# Patient Record
Sex: FEMALE | Race: ASIAN | NOT HISPANIC OR LATINO | Employment: PART TIME | URBAN - METROPOLITAN AREA
[De-identification: names, ages, dates, MRNs, and addresses within clinical notes are randomized per-mention and may not be internally consistent; named-entity substitution may affect disease eponyms.]

---

## 2023-03-20 ENCOUNTER — CLINICAL SUPPORT (OUTPATIENT)
Dept: PRIMARY CARE | Facility: CLINIC | Age: 68
End: 2023-03-20
Payer: MEDICARE

## 2023-03-20 ENCOUNTER — TELEPHONE (OUTPATIENT)
Dept: PRIMARY CARE | Facility: CLINIC | Age: 68
End: 2023-03-20

## 2023-03-20 DIAGNOSIS — R30.0 DYSURIA: Primary | ICD-10-CM

## 2023-03-20 LAB
POC APPEARANCE, URINE: ABNORMAL
POC BILIRUBIN, URINE: NEGATIVE
POC BLOOD, URINE: ABNORMAL
POC COLOR, URINE: ABNORMAL
POC GLUCOSE, URINE: NEGATIVE MG/DL
POC KETONES, URINE: NEGATIVE MG/DL
POC LEUKOCYTES, URINE: ABNORMAL
POC NITRITE,URINE: NEGATIVE
POC PH, URINE: 7 PH
POC PROTEIN, URINE: NEGATIVE MG/DL
POC SPECIFIC GRAVITY, URINE: 1.01
POC UROBILINOGEN, URINE: 0.2 EU/DL

## 2023-03-20 PROCEDURE — 81003 URINALYSIS AUTO W/O SCOPE: CPT | Performed by: NURSE PRACTITIONER

## 2023-03-20 PROCEDURE — 87086 URINE CULTURE/COLONY COUNT: CPT

## 2023-03-20 PROCEDURE — 87077 CULTURE AEROBIC IDENTIFY: CPT

## 2023-03-20 PROCEDURE — 87186 SC STD MICRODIL/AGAR DIL: CPT

## 2023-03-20 RX ORDER — CIPROFLOXACIN 500 MG/1
500 TABLET ORAL 2 TIMES DAILY
Qty: 10 TABLET | Refills: 0 | Status: SHIPPED | OUTPATIENT
Start: 2023-03-20 | End: 2023-03-25

## 2023-03-20 NOTE — TELEPHONE ENCOUNTER
----- Message from JESS Westbrook-CNP sent at 3/20/2023 12:40 PM EDT -----  Tell her Urine is positive - I will send antibiotic- please send out for sensitivity

## 2023-03-22 LAB — URINE CULTURE: ABNORMAL

## 2023-03-23 ENCOUNTER — TELEPHONE (OUTPATIENT)
Dept: PRIMARY CARE | Facility: CLINIC | Age: 68
End: 2023-03-23
Payer: MEDICARE

## 2023-03-23 NOTE — TELEPHONE ENCOUNTER
----- Message from JESS Westbrook-CNP sent at 3/23/2023  7:21 AM EDT -----  PNP that the antibiotic should help her- let me know if not

## 2023-03-26 PROBLEM — M25.561 ARTHRALGIA OF BOTH KNEES: Status: ACTIVE | Noted: 2023-03-26

## 2023-03-26 PROBLEM — G47.00 INSOMNIA: Status: ACTIVE | Noted: 2023-03-26

## 2023-03-26 PROBLEM — M51.369 DEGENERATIVE DISC DISEASE, LUMBAR: Status: ACTIVE | Noted: 2023-03-26

## 2023-03-26 PROBLEM — N95.2 ATROPHY OF VAGINA: Status: ACTIVE | Noted: 2023-03-26

## 2023-03-26 PROBLEM — M54.16 LUMBAR RADICULAR PAIN: Status: ACTIVE | Noted: 2023-03-26

## 2023-03-26 PROBLEM — M25.542 JOINT PAIN IN BOTH HANDS: Status: ACTIVE | Noted: 2023-03-26

## 2023-03-26 PROBLEM — M25.552 LEFT HIP PAIN: Status: ACTIVE | Noted: 2023-03-26

## 2023-03-26 PROBLEM — M25.541 JOINT PAIN IN BOTH HANDS: Status: ACTIVE | Noted: 2023-03-26

## 2023-03-26 PROBLEM — M51.36 DEGENERATIVE DISC DISEASE, LUMBAR: Status: ACTIVE | Noted: 2023-03-26

## 2023-03-26 PROBLEM — F41.9 MILD ANXIETY: Status: ACTIVE | Noted: 2023-03-26

## 2023-03-26 PROBLEM — M25.562 ARTHRALGIA OF BOTH KNEES: Status: ACTIVE | Noted: 2023-03-26

## 2023-03-26 PROBLEM — B07.0 PLANTAR WARTS: Status: ACTIVE | Noted: 2023-03-26

## 2023-03-26 RX ORDER — CLONAZEPAM 1 MG/1
1 TABLET ORAL NIGHTLY
COMMUNITY
Start: 2014-08-08 | End: 2023-03-30 | Stop reason: SDUPTHER

## 2023-03-26 RX ORDER — LISINOPRIL 10 MG/1
1 TABLET ORAL DAILY
COMMUNITY
End: 2023-11-29 | Stop reason: SDUPTHER

## 2023-03-26 RX ORDER — ROPINIROLE 1 MG/1
1 TABLET, FILM COATED ORAL 3 TIMES DAILY
COMMUNITY
Start: 2014-08-08 | End: 2023-11-29 | Stop reason: SDUPTHER

## 2023-03-30 ENCOUNTER — OFFICE VISIT (OUTPATIENT)
Dept: PRIMARY CARE | Facility: CLINIC | Age: 68
End: 2023-03-30
Payer: MEDICARE

## 2023-03-30 VITALS
SYSTOLIC BLOOD PRESSURE: 110 MMHG | HEIGHT: 59 IN | HEART RATE: 78 BPM | DIASTOLIC BLOOD PRESSURE: 68 MMHG | TEMPERATURE: 98.1 F | RESPIRATION RATE: 16 BRPM | WEIGHT: 107 LBS | OXYGEN SATURATION: 96 % | BODY MASS INDEX: 21.57 KG/M2

## 2023-03-30 DIAGNOSIS — F41.9 MILD ANXIETY: Primary | ICD-10-CM

## 2023-03-30 DIAGNOSIS — R30.0 DYSURIA: ICD-10-CM

## 2023-03-30 PROCEDURE — 1036F TOBACCO NON-USER: CPT | Performed by: NURSE PRACTITIONER

## 2023-03-30 PROCEDURE — 99213 OFFICE O/P EST LOW 20 MIN: CPT | Performed by: NURSE PRACTITIONER

## 2023-03-30 PROCEDURE — 87086 URINE CULTURE/COLONY COUNT: CPT

## 2023-03-30 RX ORDER — CLONAZEPAM 1 MG/1
1 TABLET ORAL 2 TIMES DAILY
Qty: 180 TABLET | Refills: 0 | Status: SHIPPED | OUTPATIENT
Start: 2023-03-30 | End: 2023-05-30 | Stop reason: SDUPTHER

## 2023-03-30 ASSESSMENT — PATIENT HEALTH QUESTIONNAIRE - PHQ9
2. FEELING DOWN, DEPRESSED OR HOPELESS: NOT AT ALL
1. LITTLE INTEREST OR PLEASURE IN DOING THINGS: NOT AT ALL
SUM OF ALL RESPONSES TO PHQ9 QUESTIONS 1 AND 2: 0

## 2023-03-30 ASSESSMENT — ENCOUNTER SYMPTOMS
DEPRESSION: 0
OCCASIONAL FEELINGS OF UNSTEADINESS: 0
LOSS OF SENSATION IN FEET: 0

## 2023-03-30 NOTE — PROGRESS NOTES
Subjective   Patient ID: Elin Randhawa is a 67 y.o. female who presents for Follow-up (Patient present today for a follow up and medication refills. ).  HPI   She is still feeling a little difference in her urination and would like a recheck     She is having a harder time sleeping but has had a lot of difficult issues arise and she feels that she is having more issues with her anxiety  Discussed therapist and she will think about it    Review of Systems  Constitutional: no chills, no fever and no night sweats.   Eyes: no blurred vision and no eyesight problems.   Cardiovascular: no chest pain, no intermittent leg claudication, no lower extremity edema, no palpitations and no syncope.   Respiratory: no cough, no shortness of breath during exertion, no shortness of breath at rest and no wheezing.   Gastrointestinal: no abdominal pain, no blood in stools, chronic constipation, no diarrhea, no melena, no nausea, no rectal pain and no vomiting.  Musculoskeletal: no arthralgias, no back pain and no myalgias.   Integumentary: no new skin lesions and no rashes.   Neurological: no difficulty walking, no headache, no limb weakness, No numbness and tno ingling.   Psychiatric: Worsening anxiety, no depression, no anhedonia and no substance use disorders.      Objective   Physical Exam  Physical Exam  Constitutional: Alert and in no acute distress. Well developed, well nourished.   Head and face: Normal.   Eyes: Normal external exam.  Cardiovascular: Heart rate and rhythm were normal, normal S1 and S2, no gallops, no murmurs and no pericardial rub. Carotid pulses: Normal with no bruits. No peripheral edema.   Pulmonary: No respiratory distress. Clear bilateral breath sounds.  palpated. Normal bowel sounds.   Muscular: Normal gait and station   Neurologic: Cortical function: Normal. Coordination: Normal.   Psychiatric: Judgment and insight: Intact. Alert and oriented x 3. Mood and affect: slightly tearful        Assessment/Plan   1. Mild anxiety  OAARS report was done, reviewed and no red flags noted. UDS and CSA are up to date.    - clonazePAM (KlonoPIN) 1 mg tablet; Take 1 tablet (1 mg) by mouth in the morning and 1 tablet (1 mg) before bedtime.  Dispense: 180 tablet; Refill: 0

## 2023-04-01 LAB — URINE CULTURE: NORMAL

## 2023-04-05 ENCOUNTER — TELEPHONE (OUTPATIENT)
Dept: PRIMARY CARE | Facility: CLINIC | Age: 68
End: 2023-04-05
Payer: MEDICARE

## 2023-04-05 NOTE — TELEPHONE ENCOUNTER
----- Message from JESS Westbrook-CNP sent at 4/3/2023  7:19 AM EDT -----  Please let Angela know that there is no significant growth - UTI should be cleared

## 2023-05-01 ENCOUNTER — PATIENT MESSAGE (OUTPATIENT)
Dept: PRIMARY CARE | Facility: CLINIC | Age: 68
End: 2023-05-01
Payer: MEDICARE

## 2023-05-01 DIAGNOSIS — M25.552 LEFT HIP PAIN: ICD-10-CM

## 2023-05-01 DIAGNOSIS — M54.16 LUMBAR RADICULAR PAIN: Primary | ICD-10-CM

## 2023-05-01 DIAGNOSIS — M51.36 DEGENERATIVE DISC DISEASE, LUMBAR: ICD-10-CM

## 2023-05-26 ENCOUNTER — PATIENT MESSAGE (OUTPATIENT)
Dept: PRIMARY CARE | Facility: CLINIC | Age: 68
End: 2023-05-26
Payer: MEDICARE

## 2023-05-26 DIAGNOSIS — F41.9 MILD ANXIETY: ICD-10-CM

## 2023-05-26 NOTE — TELEPHONE ENCOUNTER
"From: Elin Randhawa  To: Kellen Dow, APRN-CNP  Sent: 5/26/2023 8:11 AM EDT  Subject: Prescription vacation supply    Good morning, aCtrina. I have an appointment on 6/12, however, I'm concerned that my prescription refill for Clonazepam might not get here on time before 6/21 when I leave for my vacation. I don't know how long it takes them to process and ship it to me. I was told by [facility] that it can be filled earlier by requesting for a \"vacation supply override.\" Would you be able to send the prescription before I see you on 6/12?    Thank you and I will see you on 6/12.    Elin  "

## 2023-05-30 RX ORDER — CLONAZEPAM 1 MG/1
1 TABLET ORAL 2 TIMES DAILY
Qty: 180 TABLET | Refills: 0 | Status: SHIPPED | OUTPATIENT
Start: 2023-05-30 | End: 2023-05-30 | Stop reason: SDUPTHER

## 2023-05-30 RX ORDER — CLONAZEPAM 1 MG/1
1 TABLET ORAL 2 TIMES DAILY
Qty: 180 TABLET | Refills: 0 | Status: SHIPPED | OUTPATIENT
Start: 2023-05-30 | End: 2023-09-26 | Stop reason: SDUPTHER

## 2023-06-12 ENCOUNTER — OFFICE VISIT (OUTPATIENT)
Dept: PRIMARY CARE | Facility: CLINIC | Age: 68
End: 2023-06-12
Payer: MEDICARE

## 2023-06-12 VITALS
WEIGHT: 104.8 LBS | TEMPERATURE: 97.9 F | SYSTOLIC BLOOD PRESSURE: 100 MMHG | DIASTOLIC BLOOD PRESSURE: 78 MMHG | OXYGEN SATURATION: 97 % | HEART RATE: 77 BPM | BODY MASS INDEX: 21.53 KG/M2

## 2023-06-12 DIAGNOSIS — G47.00 INSOMNIA, UNSPECIFIED TYPE: Primary | ICD-10-CM

## 2023-06-12 DIAGNOSIS — F41.9 MILD ANXIETY: ICD-10-CM

## 2023-06-12 DIAGNOSIS — G89.29 CHRONIC PAIN OF BOTH KNEES: ICD-10-CM

## 2023-06-12 DIAGNOSIS — G25.81 RESTLESS LEGS SYNDROME: ICD-10-CM

## 2023-06-12 DIAGNOSIS — M25.552 LEFT HIP PAIN: ICD-10-CM

## 2023-06-12 DIAGNOSIS — M25.561 CHRONIC PAIN OF BOTH KNEES: ICD-10-CM

## 2023-06-12 DIAGNOSIS — M25.562 CHRONIC PAIN OF BOTH KNEES: ICD-10-CM

## 2023-06-12 PROCEDURE — 1159F MED LIST DOCD IN RCRD: CPT | Performed by: NURSE PRACTITIONER

## 2023-06-12 PROCEDURE — 99213 OFFICE O/P EST LOW 20 MIN: CPT | Performed by: NURSE PRACTITIONER

## 2023-06-12 PROCEDURE — 1036F TOBACCO NON-USER: CPT | Performed by: NURSE PRACTITIONER

## 2023-06-12 ASSESSMENT — ENCOUNTER SYMPTOMS
NERVOUS/ANXIOUS: 1
ENDOCRINE NEGATIVE: 1
GASTROINTESTINAL NEGATIVE: 1
RESPIRATORY NEGATIVE: 1
SLEEP DISTURBANCE: 1
HEMATOLOGIC/LYMPHATIC NEGATIVE: 1
NEUROLOGICAL NEGATIVE: 1
CARDIOVASCULAR NEGATIVE: 1
EYES NEGATIVE: 1
MUSCULOSKELETAL NEGATIVE: 1
CONSTITUTIONAL NEGATIVE: 1

## 2023-06-12 NOTE — PATIENT INSTRUCTIONS
I have sent over the Clonazepam - earlier- Your last conversation with pharmacy said all was that everything is all set and they are sending 60 days   If she does not get the medication on Thursday I will print an RX that she can  at local pharmacy

## 2023-06-12 NOTE — PROGRESS NOTES
Subjective   Patient ID: Elin Randhawa is a 67 y.o. female who presents for Follow-up (3 Months.) and Medication Question (Anxiety meds for plane ).    HPI   Patient here for 3 month follow up. No concerns, Is leaving the country for vacation and need medication refill.   She has been having issues with getting her clonazepam refilled because she needs to get this refilled   She is still having issues with her back pain hip pain and bilateral knee pain   She would like to get Xray orders and get them done   In the [facility] as it will be cheaper - discussed that she will need the radiology report or she will pay to have them read here   Review of Systems   Constitutional: Negative.    HENT: Negative.     Eyes: Negative.    Respiratory: Negative.     Cardiovascular: Negative.    Gastrointestinal: Negative.    Endocrine: Negative.    Genitourinary: Negative.    Musculoskeletal: Negative.    Skin: Negative.    Allergic/Immunologic: Positive for environmental allergies.   Neurological: Negative.    Hematological: Negative.    Psychiatric/Behavioral:  Positive for sleep disturbance. The patient is nervous/anxious.        Objective   /78   Pulse 77   Temp 36.6 °C (97.9 °F) (Temporal)   Wt 47.5 kg (104 lb 12.8 oz)   SpO2 97%   BMI 21.53 kg/m²     Physical Exam  Constitutional:       Appearance: Normal appearance.   Cardiovascular:      Rate and Rhythm: Normal rate and regular rhythm.      Pulses: Normal pulses.      Heart sounds: Normal heart sounds.   Pulmonary:      Effort: Pulmonary effort is normal.      Breath sounds: Normal breath sounds.   Abdominal:      General: Bowel sounds are normal.      Palpations: Abdomen is soft.   Skin:     General: Skin is warm.   Neurological:      General: No focal deficit present.      Mental Status: She is alert and oriented to person, place, and time. Mental status is at baseline.   Psychiatric:         Mood and Affect: Mood normal.         Behavior: Behavior normal.          Thought Content: Thought content normal.         Judgment: Judgment normal.         Assessment/Plan   Problem List Items Addressed This Visit          Nervous    Insomnia - Primary    Restless legs syndrome       Other    Mild anxiety     I have sent over the Clonazepam - earlier- Your last conversation with pharmacy said all was that everything is all set and they are sending 60 days   If she does not get the medication on Thursday I will print an RX that she can  at local pharmacy

## 2023-06-13 ENCOUNTER — TELEPHONE (OUTPATIENT)
Dept: PRIMARY CARE | Facility: CLINIC | Age: 68
End: 2023-06-13
Payer: MEDICARE

## 2023-06-13 NOTE — TELEPHONE ENCOUNTER
Pt calling back- she does not need you to send to [facility] [place]. Pt is paying for expedited shipping.

## 2023-06-13 NOTE — TELEPHONE ENCOUNTER
[place] from [facility] requesting clonazepam be sent to [facility]. Pt is going out of the country and mail order will not arrive in time.

## 2023-09-21 ENCOUNTER — TELEPHONE (OUTPATIENT)
Dept: PRIMARY CARE | Facility: CLINIC | Age: 68
End: 2023-09-21
Payer: MEDICARE

## 2023-09-21 DIAGNOSIS — F41.9 MILD ANXIETY: ICD-10-CM

## 2023-09-26 RX ORDER — CLONAZEPAM 1 MG/1
1 TABLET ORAL 2 TIMES DAILY
Qty: 180 TABLET | Refills: 0 | Status: SHIPPED | OUTPATIENT
Start: 2023-09-26 | End: 2024-01-11 | Stop reason: SDUPTHER

## 2023-10-10 ENCOUNTER — OFFICE VISIT (OUTPATIENT)
Dept: PRIMARY CARE | Facility: CLINIC | Age: 68
End: 2023-10-10
Payer: MEDICARE

## 2023-10-10 VITALS
WEIGHT: 106.2 LBS | BODY MASS INDEX: 21.41 KG/M2 | HEART RATE: 70 BPM | DIASTOLIC BLOOD PRESSURE: 68 MMHG | TEMPERATURE: 98.2 F | SYSTOLIC BLOOD PRESSURE: 128 MMHG | HEIGHT: 59 IN | OXYGEN SATURATION: 97 % | RESPIRATION RATE: 18 BRPM

## 2023-10-10 DIAGNOSIS — Z79.899 HIGH RISK MEDICATION USE: ICD-10-CM

## 2023-10-10 DIAGNOSIS — G47.00 INSOMNIA, UNSPECIFIED TYPE: ICD-10-CM

## 2023-10-10 DIAGNOSIS — Z00.00 HEALTHCARE MAINTENANCE: ICD-10-CM

## 2023-10-10 DIAGNOSIS — Z12.31 BREAST CANCER SCREENING BY MAMMOGRAM: ICD-10-CM

## 2023-10-10 DIAGNOSIS — R25.2 MUSCLE CRAMPING: ICD-10-CM

## 2023-10-10 DIAGNOSIS — Z13.220 LIPID SCREENING: Primary | ICD-10-CM

## 2023-10-10 DIAGNOSIS — M51.36 DEGENERATIVE DISC DISEASE, LUMBAR: ICD-10-CM

## 2023-10-10 PROCEDURE — 99214 OFFICE O/P EST MOD 30 MIN: CPT | Performed by: FAMILY MEDICINE

## 2023-10-10 PROCEDURE — 90686 IIV4 VACC NO PRSV 0.5 ML IM: CPT | Performed by: FAMILY MEDICINE

## 2023-10-10 PROCEDURE — 1036F TOBACCO NON-USER: CPT | Performed by: FAMILY MEDICINE

## 2023-10-10 PROCEDURE — 1125F AMNT PAIN NOTED PAIN PRSNT: CPT | Performed by: FAMILY MEDICINE

## 2023-10-10 PROCEDURE — G0008 ADMIN INFLUENZA VIRUS VAC: HCPCS | Performed by: FAMILY MEDICINE

## 2023-10-10 PROCEDURE — 1170F FXNL STATUS ASSESSED: CPT | Performed by: FAMILY MEDICINE

## 2023-10-10 PROCEDURE — 1159F MED LIST DOCD IN RCRD: CPT | Performed by: FAMILY MEDICINE

## 2023-10-10 PROCEDURE — 1160F RVW MEDS BY RX/DR IN RCRD: CPT | Performed by: FAMILY MEDICINE

## 2023-10-10 PROCEDURE — G0439 PPPS, SUBSEQ VISIT: HCPCS | Performed by: FAMILY MEDICINE

## 2023-10-10 RX ORDER — MULTIVITAMIN
1 TABLET ORAL DAILY
COMMUNITY

## 2023-10-10 RX ORDER — MELOXICAM 7.5 MG/1
7.5 TABLET ORAL DAILY
Qty: 30 TABLET | Refills: 0 | Status: SHIPPED | OUTPATIENT
Start: 2023-10-10 | End: 2023-11-09

## 2023-10-10 ASSESSMENT — ENCOUNTER SYMPTOMS
ARTHRALGIAS: 1
OCCASIONAL FEELINGS OF UNSTEADINESS: 0
VOMITING: 0
DYSPHORIC MOOD: 0
MYALGIAS: 0
CHILLS: 0
FEVER: 0
DIARRHEA: 0
CONSTIPATION: 0
ABDOMINAL PAIN: 0
SHORTNESS OF BREATH: 0
HEADACHES: 0
NAUSEA: 0
BACK PAIN: 1
DEPRESSION: 0
PALPITATIONS: 0
LOSS OF SENSATION IN FEET: 0
DIZZINESS: 0
WEAKNESS: 0
NERVOUS/ANXIOUS: 0
COUGH: 0
FATIGUE: 0

## 2023-10-10 ASSESSMENT — ACTIVITIES OF DAILY LIVING (ADL)
DOING_HOUSEWORK: INDEPENDENT
TAKING_MEDICATION: INDEPENDENT
MANAGING_FINANCES: INDEPENDENT
DRESSING: INDEPENDENT
BATHING: INDEPENDENT
GROCERY_SHOPPING: INDEPENDENT

## 2023-10-10 ASSESSMENT — COLUMBIA-SUICIDE SEVERITY RATING SCALE - C-SSRS
6. HAVE YOU EVER DONE ANYTHING, STARTED TO DO ANYTHING, OR PREPARED TO DO ANYTHING TO END YOUR LIFE?: NO
2. HAVE YOU ACTUALLY HAD ANY THOUGHTS OF KILLING YOURSELF?: NO
1. IN THE PAST MONTH, HAVE YOU WISHED YOU WERE DEAD OR WISHED YOU COULD GO TO SLEEP AND NOT WAKE UP?: NO

## 2023-10-10 ASSESSMENT — ANXIETY QUESTIONNAIRES
7. FEELING AFRAID AS IF SOMETHING AWFUL MIGHT HAPPEN: NOT AT ALL
4. TROUBLE RELAXING: NOT AT ALL
3. WORRYING TOO MUCH ABOUT DIFFERENT THINGS: NOT AT ALL
GAD7 TOTAL SCORE: 0
5. BEING SO RESTLESS THAT IT IS HARD TO SIT STILL: NOT AT ALL
2. NOT BEING ABLE TO STOP OR CONTROL WORRYING: NOT AT ALL
6. BECOMING EASILY ANNOYED OR IRRITABLE: NOT AT ALL
1. FEELING NERVOUS, ANXIOUS, OR ON EDGE: NOT AT ALL

## 2023-10-10 ASSESSMENT — PAIN SCALES - GENERAL: PAINLEVEL: 6

## 2023-10-10 ASSESSMENT — PATIENT HEALTH QUESTIONNAIRE - PHQ9
2. FEELING DOWN, DEPRESSED OR HOPELESS: NOT AT ALL
SUM OF ALL RESPONSES TO PHQ9 QUESTIONS 1 AND 2: 0
1. LITTLE INTEREST OR PLEASURE IN DOING THINGS: NOT AT ALL

## 2023-10-10 NOTE — PROGRESS NOTES
Subjective   Reason for Visit: Elin Randhawa is an 67 y.o. female here for a Medicare Wellness visit.     Past Medical, Surgical, and Family History reviewed and updated in chart.    Reviewed all medications by prescribing practitioner or clinical pharmacist (such as prescriptions, OTCs, herbal therapies and supplements) and documented in the medical record.    Arthralgias- xrays of knees and lumbar spine recently completed and were reviewed today, results showed degenerative changes in each film. Is very active-low impact activities and takes OTC NSAIDS as needed, has cut back on her activity due to low back pain. Has tried massage that helps. Has tried acupuncture, yoga, stretching as well. Denies saddle anesthesia, loss of bladder function    Anxiety-takes clonazepam daily for sleep and anxiety and is working well for her. CSA and UDS updated today.        OARRS:  JESS Lanier-CNP on 10/10/2023 12:01 PM  I have personally reviewed the OARRS report for Elin Randhawa. I have considered the risks of abuse, dependence, addiction and diversion and I believe that it is clinically appropriate for Elin Randhawa to be prescribed this medication    Is the patient prescribed a combination of a benzodiazepine and opioid?  No    Last Urine Drug Screen / ordered today: Yes  No results found for this or any previous visit (from the past 8760 hour(s)).  N/A        Controlled Substance Agreement:  Date of the Last Agreement: 10/10/23  Reviewed Controlled Substance Agreement including but not limited to the benefits, risks, and alternatives to treatment with a Controlled Substance medication(s).    Benzodiazepines:  What is the patient's goal of therapy? To improve sleep related to anxiety  Is this being achieved with current treatment? yes    KARINA-7:  Over the last 2 weeks, how often have you been bothered by any of the following problems?  Feeling nervous, anxious, or on edge: 0  Not being able to stop or  "control worryin  Worrying too much about different things: 0  Trouble relaxin  Being so restless that it is hard to sit still: 0  Becoming easily annoyed or irritable: 0  Feeling afraid as if something awful might happen: 0  KARINA-7 Total Score: 0        Activities of Daily Living:   Is your overall impression that this patient is benefiting (symptom reduction outweighs side effects) from benzodiazepine therapy? Yes     1. Physical Functioning: Better  2. Family Relationship: Better  3. Social Relationship: Better  4. Mood: Better  5. Sleep Patterns: Better  6. Overall Function: Better    Patient Care Team:  KIRILL Lanier as PCP - General (Family Medicine)  KIRILL Westbrook as PCP - [employer] PCP     Review of Systems   Constitutional:  Negative for chills, fatigue and fever.   Eyes:  Negative for visual disturbance.   Respiratory:  Negative for cough and shortness of breath.    Cardiovascular:  Negative for chest pain and palpitations.   Gastrointestinal:  Negative for abdominal pain, constipation, diarrhea, nausea and vomiting.   Musculoskeletal:  Positive for arthralgias and back pain. Negative for myalgias.   Skin:  Negative for rash.   Neurological:  Negative for dizziness, syncope, weakness and headaches.   Psychiatric/Behavioral:  Negative for dysphoric mood. The patient is not nervous/anxious.        Objective   Vitals:  /68 (BP Location: Right arm, Patient Position: Sitting, BP Cuff Size: Adult)   Pulse 70   Temp 36.8 °C (98.2 °F)   Resp 18   Ht 1.499 m (4' 11\")   Wt 48.2 kg (106 lb 3.2 oz)   SpO2 97%   BMI 21.45 kg/m²       Physical Exam  Constitutional:       Appearance: Normal appearance.   HENT:      Head: Normocephalic and atraumatic.   Cardiovascular:      Rate and Rhythm: Normal rate and regular rhythm.      Heart sounds: No murmur heard.     No gallop.   Pulmonary:      Effort: Pulmonary effort is normal. No respiratory distress.      " Breath sounds: Normal breath sounds.   Abdominal:      General: Bowel sounds are normal. There is no distension.      Tenderness: There is no abdominal tenderness.   Musculoskeletal:         General: Normal range of motion.   Skin:     General: Skin is warm and dry.      Findings: No lesion or rash.   Neurological:      General: No focal deficit present.      Mental Status: She is alert and oriented to person, place, and time. Mental status is at baseline.   Psychiatric:         Mood and Affect: Mood normal.         Behavior: Behavior normal.         Assessment/Plan   Problem List Items Addressed This Visit       Degenerative disc disease, lumbar    Relevant Medications    meloxicam (Mobic) 7.5 mg tablet    Insomnia    Relevant Orders    CBC and Auto Differential     Other Visit Diagnoses       Lipid screening    -  Primary    Relevant Orders    Lipid Panel    Healthcare maintenance        Relevant Orders    TSH with reflex to Free T4 if abnormal    Comprehensive Metabolic Panel    CBC and Auto Differential    High risk medication use        Relevant Orders    Drug Screen, Urine With Reflex to Confirmation    Breast cancer screening by mammogram        Relevant Orders    BI mammo bilateral screening tomosynthesis    Muscle cramping        Relevant Orders    Magnesium

## 2023-10-25 ENCOUNTER — APPOINTMENT (OUTPATIENT)
Dept: RADIOLOGY | Facility: HOSPITAL | Age: 68
End: 2023-10-25
Payer: MEDICARE

## 2023-10-25 ENCOUNTER — HOSPITAL ENCOUNTER (EMERGENCY)
Facility: HOSPITAL | Age: 68
Discharge: HOME | End: 2023-10-25
Attending: STUDENT IN AN ORGANIZED HEALTH CARE EDUCATION/TRAINING PROGRAM
Payer: MEDICARE

## 2023-10-25 VITALS
BODY MASS INDEX: 22.04 KG/M2 | HEART RATE: 74 BPM | SYSTOLIC BLOOD PRESSURE: 122 MMHG | HEIGHT: 59 IN | WEIGHT: 109.35 LBS | DIASTOLIC BLOOD PRESSURE: 60 MMHG | OXYGEN SATURATION: 98 % | RESPIRATION RATE: 20 BRPM | TEMPERATURE: 98.6 F

## 2023-10-25 DIAGNOSIS — M79.18 MUSCULOSKELETAL PAIN: ICD-10-CM

## 2023-10-25 DIAGNOSIS — Z04.1 EXAM FOLLOWING MVC (MOTOR VEHICLE COLLISION), NO APPARENT INJURY: Primary | ICD-10-CM

## 2023-10-25 LAB
ABO GROUP (TYPE) IN BLOOD: NORMAL
ALBUMIN SERPL BCP-MCNC: 4.2 G/DL (ref 3.4–5)
ALP SERPL-CCNC: 72 U/L (ref 33–136)
ALT SERPL W P-5'-P-CCNC: 40 U/L (ref 7–45)
ANION GAP SERPL CALC-SCNC: 13 MMOL/L (ref 10–20)
ANTIBODY SCREEN: NORMAL
AST SERPL W P-5'-P-CCNC: 36 U/L (ref 9–39)
BASOPHILS # BLD AUTO: 0.06 X10*3/UL (ref 0–0.1)
BASOPHILS NFR BLD AUTO: 0.9 %
BILIRUB SERPL-MCNC: 0.5 MG/DL (ref 0–1.2)
BUN SERPL-MCNC: 24 MG/DL (ref 6–23)
CALCIUM SERPL-MCNC: 9.6 MG/DL (ref 8.6–10.3)
CARDIAC TROPONIN I PNL SERPL HS: 3 NG/L (ref 0–13)
CHLORIDE SERPL-SCNC: 103 MMOL/L (ref 98–107)
CO2 SERPL-SCNC: 24 MMOL/L (ref 21–32)
CREAT SERPL-MCNC: 0.82 MG/DL (ref 0.5–1.05)
EOSINOPHIL # BLD AUTO: 0.46 X10*3/UL (ref 0–0.7)
EOSINOPHIL NFR BLD AUTO: 7 %
ERYTHROCYTE [DISTWIDTH] IN BLOOD BY AUTOMATED COUNT: 13.2 % (ref 11.5–14.5)
ETHANOL SERPL-MCNC: <10 MG/DL
GFR SERPL CREATININE-BSD FRML MDRD: 79 ML/MIN/1.73M*2
GLUCOSE SERPL-MCNC: 99 MG/DL (ref 74–99)
HCT VFR BLD AUTO: 41.3 % (ref 36–46)
HGB BLD-MCNC: 13.2 G/DL (ref 12–16)
IMM GRANULOCYTES # BLD AUTO: 0.09 X10*3/UL (ref 0–0.7)
IMM GRANULOCYTES NFR BLD AUTO: 1.4 % (ref 0–0.9)
INR PPP: 1 (ref 0.9–1.1)
LACTATE SERPL-SCNC: 0.6 MMOL/L (ref 0.4–2)
LYMPHOCYTES # BLD AUTO: 2.09 X10*3/UL (ref 1.2–4.8)
LYMPHOCYTES NFR BLD AUTO: 31.7 %
MCH RBC QN AUTO: 28.8 PG (ref 26–34)
MCHC RBC AUTO-ENTMCNC: 32 G/DL (ref 32–36)
MCV RBC AUTO: 90 FL (ref 80–100)
MONOCYTES # BLD AUTO: 0.47 X10*3/UL (ref 0.1–1)
MONOCYTES NFR BLD AUTO: 7.1 %
NEUTROPHILS # BLD AUTO: 3.43 X10*3/UL (ref 1.2–7.7)
NEUTROPHILS NFR BLD AUTO: 51.9 %
NRBC BLD-RTO: 0 /100 WBCS (ref 0–0)
PLATELET # BLD AUTO: 328 X10*3/UL (ref 150–450)
PMV BLD AUTO: 8.9 FL (ref 7.5–11.5)
POTASSIUM SERPL-SCNC: 3.8 MMOL/L (ref 3.5–5.3)
PROT SERPL-MCNC: 7.2 G/DL (ref 6.4–8.2)
PROTHROMBIN TIME: 11.6 SECONDS (ref 9.8–12.8)
RBC # BLD AUTO: 4.59 X10*6/UL (ref 4–5.2)
RH FACTOR (ANTIGEN D): NORMAL
SODIUM SERPL-SCNC: 136 MMOL/L (ref 136–145)
WBC # BLD AUTO: 6.6 X10*3/UL (ref 4.4–11.3)

## 2023-10-25 PROCEDURE — 2500000004 HC RX 250 GENERAL PHARMACY W/ HCPCS (ALT 636 FOR OP/ED): Performed by: STUDENT IN AN ORGANIZED HEALTH CARE EDUCATION/TRAINING PROGRAM

## 2023-10-25 PROCEDURE — 70450 CT HEAD/BRAIN W/O DYE: CPT | Performed by: RADIOLOGY

## 2023-10-25 PROCEDURE — 70450 CT HEAD/BRAIN W/O DYE: CPT

## 2023-10-25 PROCEDURE — 72125 CT NECK SPINE W/O DYE: CPT | Performed by: RADIOLOGY

## 2023-10-25 PROCEDURE — 80053 COMPREHEN METABOLIC PANEL: CPT | Performed by: STUDENT IN AN ORGANIZED HEALTH CARE EDUCATION/TRAINING PROGRAM

## 2023-10-25 PROCEDURE — 72125 CT NECK SPINE W/O DYE: CPT | Mod: MG

## 2023-10-25 PROCEDURE — 71045 X-RAY EXAM CHEST 1 VIEW: CPT | Mod: FY

## 2023-10-25 PROCEDURE — 72170 X-RAY EXAM OF PELVIS: CPT | Mod: FY

## 2023-10-25 PROCEDURE — 99285 EMERGENCY DEPT VISIT HI MDM: CPT | Mod: 25

## 2023-10-25 PROCEDURE — 71260 CT THORAX DX C+: CPT | Mod: RSC | Performed by: RADIOLOGY

## 2023-10-25 PROCEDURE — 72128 CT CHEST SPINE W/O DYE: CPT | Mod: RSC,MG

## 2023-10-25 PROCEDURE — 86901 BLOOD TYPING SEROLOGIC RH(D): CPT | Performed by: STUDENT IN AN ORGANIZED HEALTH CARE EDUCATION/TRAINING PROGRAM

## 2023-10-25 PROCEDURE — 96374 THER/PROPH/DIAG INJ IV PUSH: CPT | Mod: 59

## 2023-10-25 PROCEDURE — 99291 CRITICAL CARE FIRST HOUR: CPT | Performed by: STUDENT IN AN ORGANIZED HEALTH CARE EDUCATION/TRAINING PROGRAM

## 2023-10-25 PROCEDURE — G0390 TRAUMA RESPONS W/HOSP CRITI: HCPCS

## 2023-10-25 PROCEDURE — 85025 COMPLETE CBC W/AUTO DIFF WBC: CPT | Performed by: STUDENT IN AN ORGANIZED HEALTH CARE EDUCATION/TRAINING PROGRAM

## 2023-10-25 PROCEDURE — 36415 COLL VENOUS BLD VENIPUNCTURE: CPT | Performed by: STUDENT IN AN ORGANIZED HEALTH CARE EDUCATION/TRAINING PROGRAM

## 2023-10-25 PROCEDURE — 71260 CT THORAX DX C+: CPT | Mod: MG

## 2023-10-25 PROCEDURE — 72170 X-RAY EXAM OF PELVIS: CPT | Mod: FOREIGN READ | Performed by: RADIOLOGY

## 2023-10-25 PROCEDURE — 72128 CT CHEST SPINE W/O DYE: CPT | Mod: RSC | Performed by: RADIOLOGY

## 2023-10-25 PROCEDURE — 96361 HYDRATE IV INFUSION ADD-ON: CPT

## 2023-10-25 PROCEDURE — 2550000001 HC RX 255 CONTRASTS: Performed by: STUDENT IN AN ORGANIZED HEALTH CARE EDUCATION/TRAINING PROGRAM

## 2023-10-25 PROCEDURE — 72131 CT LUMBAR SPINE W/O DYE: CPT | Mod: RSC | Performed by: RADIOLOGY

## 2023-10-25 PROCEDURE — 84484 ASSAY OF TROPONIN QUANT: CPT | Performed by: STUDENT IN AN ORGANIZED HEALTH CARE EDUCATION/TRAINING PROGRAM

## 2023-10-25 PROCEDURE — 83605 ASSAY OF LACTIC ACID: CPT | Performed by: STUDENT IN AN ORGANIZED HEALTH CARE EDUCATION/TRAINING PROGRAM

## 2023-10-25 PROCEDURE — 74177 CT ABD & PELVIS W/CONTRAST: CPT | Mod: RSC | Performed by: RADIOLOGY

## 2023-10-25 PROCEDURE — 71045 X-RAY EXAM CHEST 1 VIEW: CPT | Mod: FOREIGN READ | Performed by: RADIOLOGY

## 2023-10-25 PROCEDURE — 85610 PROTHROMBIN TIME: CPT | Performed by: STUDENT IN AN ORGANIZED HEALTH CARE EDUCATION/TRAINING PROGRAM

## 2023-10-25 PROCEDURE — 72131 CT LUMBAR SPINE W/O DYE: CPT | Mod: RSC,MG

## 2023-10-25 PROCEDURE — 82077 ASSAY SPEC XCP UR&BREATH IA: CPT | Performed by: STUDENT IN AN ORGANIZED HEALTH CARE EDUCATION/TRAINING PROGRAM

## 2023-10-25 RX ORDER — ACETAMINOPHEN 500 MG
1000 TABLET ORAL EVERY 8 HOURS PRN
Qty: 30 TABLET | Refills: 0 | Status: SHIPPED | OUTPATIENT
Start: 2023-10-25 | End: 2023-10-30

## 2023-10-25 RX ORDER — NAPROXEN 500 MG/1
500 TABLET ORAL
Qty: 10 TABLET | Refills: 0 | Status: SHIPPED | OUTPATIENT
Start: 2023-10-25 | End: 2023-10-30

## 2023-10-25 RX ORDER — CYCLOBENZAPRINE HCL 10 MG
10 TABLET ORAL 2 TIMES DAILY PRN
Qty: 10 TABLET | Refills: 0 | Status: SHIPPED | OUTPATIENT
Start: 2023-10-25 | End: 2024-01-10 | Stop reason: SDUPTHER

## 2023-10-25 RX ORDER — MORPHINE SULFATE 2 MG/ML
2 INJECTION, SOLUTION INTRAMUSCULAR; INTRAVENOUS ONCE
Status: COMPLETED | OUTPATIENT
Start: 2023-10-25 | End: 2023-10-25

## 2023-10-25 RX ADMIN — MORPHINE SULFATE 2 MG: 2 INJECTION, SOLUTION INTRAMUSCULAR; INTRAVENOUS at 09:25

## 2023-10-25 RX ADMIN — SODIUM CHLORIDE, POTASSIUM CHLORIDE, SODIUM LACTATE AND CALCIUM CHLORIDE 1000 ML: 600; 310; 30; 20 INJECTION, SOLUTION INTRAVENOUS at 09:24

## 2023-10-25 RX ADMIN — IOHEXOL 100 ML: 350 INJECTION, SOLUTION INTRAVENOUS at 10:04

## 2023-10-25 ASSESSMENT — LIFESTYLE VARIABLES
EVER HAD A DRINK FIRST THING IN THE MORNING TO STEADY YOUR NERVES TO GET RID OF A HANGOVER: NO
HAVE YOU EVER FELT YOU SHOULD CUT DOWN ON YOUR DRINKING: NO
EVER FELT BAD OR GUILTY ABOUT YOUR DRINKING: NO
REASON UNABLE TO ASSESS: NO
HAVE PEOPLE ANNOYED YOU BY CRITICIZING YOUR DRINKING: NO

## 2023-10-25 ASSESSMENT — PAIN - FUNCTIONAL ASSESSMENT: PAIN_FUNCTIONAL_ASSESSMENT: 0-10

## 2023-10-25 ASSESSMENT — COLUMBIA-SUICIDE SEVERITY RATING SCALE - C-SSRS
2. HAVE YOU ACTUALLY HAD ANY THOUGHTS OF KILLING YOURSELF?: NO
1. IN THE PAST MONTH, HAVE YOU WISHED YOU WERE DEAD OR WISHED YOU COULD GO TO SLEEP AND NOT WAKE UP?: NO
6. HAVE YOU EVER DONE ANYTHING, STARTED TO DO ANYTHING, OR PREPARED TO DO ANYTHING TO END YOUR LIFE?: NO

## 2023-10-25 ASSESSMENT — PAIN SCALES - GENERAL
PAINLEVEL_OUTOF10: 8
PAINLEVEL_OUTOF10: 4

## 2023-10-25 ASSESSMENT — PAIN DESCRIPTION - LOCATION: LOCATION: CHEST

## 2023-10-25 NOTE — Clinical Note
Elin Toussaint was seen and treated in our emergency department on 10/25/2023.  She may return to work on 10/27/2023.       If you have any questions or concerns, please don't hesitate to call.      Refugio Badlilo MD
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
## 2023-11-02 ENCOUNTER — OFFICE VISIT (OUTPATIENT)
Dept: PRIMARY CARE | Facility: CLINIC | Age: 68
End: 2023-11-02
Payer: MEDICARE

## 2023-11-02 ENCOUNTER — HOSPITAL ENCOUNTER (OUTPATIENT)
Dept: RADIOLOGY | Facility: HOSPITAL | Age: 68
Discharge: HOME | End: 2023-11-02
Payer: MEDICARE

## 2023-11-02 VITALS
DIASTOLIC BLOOD PRESSURE: 68 MMHG | HEART RATE: 80 BPM | SYSTOLIC BLOOD PRESSURE: 110 MMHG | BODY MASS INDEX: 21.57 KG/M2 | WEIGHT: 107 LBS | HEIGHT: 59 IN

## 2023-11-02 DIAGNOSIS — R93.5 ABNORMAL CT OF THE ABDOMEN: ICD-10-CM
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
## 2023-11-02 DIAGNOSIS — N85.9 LESION OF UTERUS: ICD-10-CM

## 2023-11-02 DIAGNOSIS — R07.89 STERNAL PAIN: ICD-10-CM

## 2023-11-02 DIAGNOSIS — R07.89 STERNAL PAIN: Primary | ICD-10-CM

## 2023-11-02 PROCEDURE — 1036F TOBACCO NON-USER: CPT | Performed by: FAMILY MEDICINE

## 2023-11-02 PROCEDURE — 71120 X-RAY EXAM BREASTBONE 2/>VWS: CPT | Mod: FY

## 2023-11-02 PROCEDURE — 1125F AMNT PAIN NOTED PAIN PRSNT: CPT | Performed by: FAMILY MEDICINE

## 2023-11-02 PROCEDURE — 71120 X-RAY EXAM BREASTBONE 2/>VWS: CPT | Performed by: RADIOLOGY

## 2023-11-02 PROCEDURE — 1160F RVW MEDS BY RX/DR IN RCRD: CPT | Performed by: FAMILY MEDICINE

## 2023-11-02 PROCEDURE — 1159F MED LIST DOCD IN RCRD: CPT | Performed by: FAMILY MEDICINE

## 2023-11-02 PROCEDURE — 99214 OFFICE O/P EST MOD 30 MIN: CPT | Performed by: FAMILY MEDICINE

## 2023-11-02 RX ORDER — NAPROXEN 500 MG/1
TABLET ORAL
COMMUNITY
Start: 2023-10-25 | End: 2024-01-10 | Stop reason: ALTCHOICE

## 2023-11-02 RX ORDER — ACETAMINOPHEN 500 MG
TABLET ORAL
COMMUNITY
Start: 2023-10-25 | End: 2024-01-10 | Stop reason: ALTCHOICE

## 2023-11-02 RX ORDER — PREDNISONE 20 MG/1
TABLET ORAL
Qty: 18 TABLET | Refills: 0 | Status: SHIPPED | OUTPATIENT
Start: 2023-11-02 | End: 2024-01-10 | Stop reason: SDUPTHER

## 2023-11-02 ASSESSMENT — PATIENT HEALTH QUESTIONNAIRE - PHQ9
SUM OF ALL RESPONSES TO PHQ9 QUESTIONS 1 AND 2: 0
1. LITTLE INTEREST OR PLEASURE IN DOING THINGS: NOT AT ALL
2. FEELING DOWN, DEPRESSED OR HOPELESS: NOT AT ALL

## 2023-11-02 ASSESSMENT — ENCOUNTER SYMPTOMS
LOSS OF SENSATION IN FEET: 0
DEPRESSION: 0
OCCASIONAL FEELINGS OF UNSTEADINESS: 0
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
## 2023-11-29 ENCOUNTER — TELEPHONE (OUTPATIENT)
Dept: PRIMARY CARE | Facility: CLINIC | Age: 68
End: 2023-11-29
Payer: MEDICARE

## 2023-11-29 DIAGNOSIS — E78.5 HYPERLIPIDEMIA, UNSPECIFIED HYPERLIPIDEMIA TYPE: ICD-10-CM

## 2023-11-29 DIAGNOSIS — G25.81 RESTLESS LEGS SYNDROME: ICD-10-CM

## 2023-11-29 NOTE — TELEPHONE ENCOUNTER
Left on voicemail:  Needs refill of Lisinopril 10mg and Ropinirole 1mg to [facility]

## 2023-11-30 RX ORDER — ROPINIROLE 1 MG/1
1 TABLET, FILM COATED ORAL 3 TIMES DAILY
Qty: 270 TABLET | Refills: 1 | Status: SHIPPED | OUTPATIENT
Start: 2023-11-30 | End: 2023-12-01 | Stop reason: SDUPTHER

## 2023-11-30 RX ORDER — LISINOPRIL 10 MG/1
10 TABLET ORAL DAILY
Qty: 90 TABLET | Refills: 1 | Status: SHIPPED | OUTPATIENT
Start: 2023-11-30 | End: 2023-12-01 | Stop reason: SDUPTHER

## 2023-12-01 ENCOUNTER — TELEPHONE (OUTPATIENT)
Dept: PRIMARY CARE | Facility: CLINIC | Age: 68
End: 2023-12-01
Payer: MEDICARE

## 2023-12-01 DIAGNOSIS — G25.81 RESTLESS LEGS SYNDROME: ICD-10-CM

## 2023-12-01 DIAGNOSIS — E78.5 HYPERLIPIDEMIA, UNSPECIFIED HYPERLIPIDEMIA TYPE: ICD-10-CM

## 2023-12-01 RX ORDER — ROPINIROLE 1 MG/1
1 TABLET, FILM COATED ORAL 3 TIMES DAILY
Qty: 270 TABLET | Refills: 1 | Status: SHIPPED | OUTPATIENT
Start: 2023-12-01 | End: 2024-06-07 | Stop reason: SDUPTHER

## 2023-12-01 RX ORDER — LISINOPRIL 10 MG/1
10 TABLET ORAL DAILY
Qty: 90 TABLET | Refills: 1 | Status: SHIPPED | OUTPATIENT
Start: 2023-12-01 | End: 2024-05-29

## 2023-12-01 NOTE — TELEPHONE ENCOUNTER
----- Message from Tootie Garcia MA sent at 12/1/2023  7:02 AM EST -----  Regarding: FW: Prescription refill  Contact: [phone]    ----- Message -----  From: Elin Randhawa  Sent: 12/1/2023   6:02 AM EST  To: Do Ramhw[address] [facility] Clinical Support Staff  Subject: Prescription refill                              Hello! I called about prescription refills for Ropinirole and Lisinopril. I noticed that the prescriptions were sent to [facility]. In my voicemail, I stated that I would like to use my mail-in pharmacy which is [facility].  Please send those prescriptions to [facility].    Thank you.    Elin Randhawa

## 2024-01-10 ENCOUNTER — OFFICE VISIT (OUTPATIENT)
Dept: PRIMARY CARE | Facility: CLINIC | Age: 69
End: 2024-01-10
Payer: MEDICARE

## 2024-01-10 VITALS
WEIGHT: 109 LBS | HEIGHT: 59 IN | HEART RATE: 95 BPM | DIASTOLIC BLOOD PRESSURE: 72 MMHG | BODY MASS INDEX: 21.97 KG/M2 | SYSTOLIC BLOOD PRESSURE: 118 MMHG

## 2024-01-10 DIAGNOSIS — E55.9 VITAMIN D DEFICIENCY: ICD-10-CM

## 2024-01-10 DIAGNOSIS — G25.81 RESTLESS LEGS SYNDROME: Primary | ICD-10-CM

## 2024-01-10 DIAGNOSIS — F41.9 MILD ANXIETY: ICD-10-CM

## 2024-01-10 DIAGNOSIS — Z79.899 MEDICATION MANAGEMENT: ICD-10-CM

## 2024-01-10 DIAGNOSIS — M79.18 MUSCULOSKELETAL PAIN: ICD-10-CM

## 2024-01-10 DIAGNOSIS — R25.2 MUSCLE CRAMPING: ICD-10-CM

## 2024-01-10 DIAGNOSIS — R07.89 STERNAL PAIN: ICD-10-CM
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
## 2024-01-10 PROCEDURE — 1125F AMNT PAIN NOTED PAIN PRSNT: CPT | Performed by: FAMILY MEDICINE

## 2024-01-10 PROCEDURE — 80307 DRUG TEST PRSMV CHEM ANLYZR: CPT

## 2024-01-10 PROCEDURE — 1036F TOBACCO NON-USER: CPT | Performed by: FAMILY MEDICINE

## 2024-01-10 PROCEDURE — 99214 OFFICE O/P EST MOD 30 MIN: CPT | Performed by: FAMILY MEDICINE

## 2024-01-10 PROCEDURE — 1159F MED LIST DOCD IN RCRD: CPT | Performed by: FAMILY MEDICINE

## 2024-01-10 RX ORDER — CYCLOBENZAPRINE HCL 10 MG
10 TABLET ORAL 2 TIMES DAILY PRN
Qty: 90 TABLET | Refills: 0 | Status: SHIPPED | OUTPATIENT
Start: 2024-01-10 | End: 2024-04-10 | Stop reason: ALTCHOICE

## 2024-01-10 RX ORDER — PREDNISONE 20 MG/1
TABLET ORAL
Qty: 18 TABLET | Refills: 0 | Status: SHIPPED | OUTPATIENT
Start: 2024-01-10 | End: 2024-04-10 | Stop reason: ALTCHOICE

## 2024-01-10 ASSESSMENT — ENCOUNTER SYMPTOMS
DYSPHORIC MOOD: 0
WEAKNESS: 0
HEADACHES: 0
MYALGIAS: 1
ARTHRALGIAS: 1
ABDOMINAL PAIN: 0
VOMITING: 0
FATIGUE: 0
NERVOUS/ANXIOUS: 0
COUGH: 0
FEVER: 0
DEPRESSION: 0
SHORTNESS OF BREATH: 0
LOSS OF SENSATION IN FEET: 0
DIARRHEA: 0
PALPITATIONS: 0
CONSTIPATION: 0
DIZZINESS: 0
NAUSEA: 0
OCCASIONAL FEELINGS OF UNSTEADINESS: 0
CHILLS: 0
BACK PAIN: 1

## 2024-01-10 ASSESSMENT — COLUMBIA-SUICIDE SEVERITY RATING SCALE - C-SSRS
1. IN THE PAST MONTH, HAVE YOU WISHED YOU WERE DEAD OR WISHED YOU COULD GO TO SLEEP AND NOT WAKE UP?: NO
2. HAVE YOU ACTUALLY HAD ANY THOUGHTS OF KILLING YOURSELF?: NO
6. HAVE YOU EVER DONE ANYTHING, STARTED TO DO ANYTHING, OR PREPARED TO DO ANYTHING TO END YOUR LIFE?: NO

## 2024-01-10 ASSESSMENT — PATIENT HEALTH QUESTIONNAIRE - PHQ9
1. LITTLE INTEREST OR PLEASURE IN DOING THINGS: NOT AT ALL
2. FEELING DOWN, DEPRESSED OR HOPELESS: NOT AT ALL
SUM OF ALL RESPONSES TO PHQ9 QUESTIONS 1 AND 2: 0
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
## 2024-01-11 ENCOUNTER — TELEPHONE (OUTPATIENT)
Dept: PRIMARY CARE | Facility: CLINIC | Age: 69
End: 2024-01-11
Payer: MEDICARE

## 2024-01-11 DIAGNOSIS — F41.9 MILD ANXIETY: ICD-10-CM

## 2024-01-11 LAB
AMPHETAMINES UR QL SCN: NORMAL
BARBITURATES UR QL SCN: NORMAL
BENZODIAZ UR QL SCN: NORMAL
BZE UR QL SCN: NORMAL
CANNABINOIDS UR QL SCN: NORMAL
FENTANYL+NORFENTANYL UR QL SCN: NORMAL
OPIATES UR QL SCN: NORMAL
OXYCODONE+OXYMORPHONE UR QL SCN: NORMAL
PCP UR QL SCN: NORMAL

## 2024-01-11 RX ORDER — CLONAZEPAM 1 MG/1
1 TABLET ORAL 2 TIMES DAILY
Qty: 180 TABLET | Refills: 0 | Status: SHIPPED | OUTPATIENT
Start: 2024-01-11 | End: 2024-04-24 | Stop reason: SDUPTHER

## 2024-01-11 NOTE — TELEPHONE ENCOUNTER
Med refill     clonazePAM (KlonoPIN) 1 mg tablet [[id number]]     [facility] - [place], [place] - [address]  [address], [facility] [address]  Phone: [phone]  Fax: [phone]

## 2024-01-18 ENCOUNTER — HOSPITAL ENCOUNTER (OUTPATIENT)
Dept: RADIOLOGY | Facility: HOSPITAL | Age: 69
Discharge: HOME | End: 2024-01-18
Payer: MEDICARE

## 2024-01-18 ENCOUNTER — TELEPHONE (OUTPATIENT)
Dept: PRIMARY CARE | Facility: CLINIC | Age: 69
End: 2024-01-18
Payer: MEDICARE

## 2024-01-18 DIAGNOSIS — R93.5 ABNORMAL CT OF THE ABDOMEN: ICD-10-CM

## 2024-01-18 DIAGNOSIS — Z12.31 BREAST CANCER SCREENING BY MAMMOGRAM: ICD-10-CM

## 2024-01-18 DIAGNOSIS — N85.9 LESION OF UTERUS: ICD-10-CM

## 2024-01-18 PROCEDURE — 77063 BREAST TOMOSYNTHESIS BI: CPT | Performed by: RADIOLOGY

## 2024-01-18 PROCEDURE — 77067 SCR MAMMO BI INCL CAD: CPT

## 2024-01-18 PROCEDURE — 2550000001 HC RX 255 CONTRASTS: Performed by: FAMILY MEDICINE

## 2024-01-18 PROCEDURE — 72197 MRI PELVIS W/O & W/DYE: CPT

## 2024-01-18 PROCEDURE — 77067 SCR MAMMO BI INCL CAD: CPT | Performed by: RADIOLOGY

## 2024-01-18 PROCEDURE — 72197 MRI PELVIS W/O & W/DYE: CPT | Performed by: RADIOLOGY

## 2024-01-18 PROCEDURE — A9575 INJ GADOTERATE MEGLUMI 0.1ML: HCPCS | Performed by: FAMILY MEDICINE

## 2024-01-18 RX ORDER — GADOTERATE MEGLUMINE 376.9 MG/ML
0.2 INJECTION INTRAVENOUS
Status: COMPLETED | OUTPATIENT
Start: 2024-01-18 | End: 2024-01-18

## 2024-01-18 RX ADMIN — GADOTERATE MEGLUMINE 9.9 ML: 376.9 INJECTION INTRAVENOUS at 12:36

## 2024-01-18 NOTE — TELEPHONE ENCOUNTER
----- Message from KIRILL Lanier sent at 1/18/2024  5:02 PM EST -----  Normal screening mammogram, it is recommended to repeat screening in 1 year

## 2024-02-10 ENCOUNTER — LAB (OUTPATIENT)
Dept: LAB | Facility: LAB | Age: 69
End: 2024-02-10
Payer: MEDICARE

## 2024-02-10 DIAGNOSIS — G47.00 INSOMNIA, UNSPECIFIED TYPE: ICD-10-CM

## 2024-02-10 DIAGNOSIS — Z13.220 LIPID SCREENING: ICD-10-CM

## 2024-02-10 DIAGNOSIS — E55.9 VITAMIN D DEFICIENCY: ICD-10-CM

## 2024-02-10 DIAGNOSIS — Z00.00 HEALTHCARE MAINTENANCE: ICD-10-CM

## 2024-02-10 DIAGNOSIS — R25.2 MUSCLE CRAMPING: ICD-10-CM

## 2024-02-10 LAB
25(OH)D3 SERPL-MCNC: 23 NG/ML (ref 30–100)
ALBUMIN SERPL BCP-MCNC: 4.2 G/DL (ref 3.4–5)
ALP SERPL-CCNC: 60 U/L (ref 33–136)
ALT SERPL W P-5'-P-CCNC: 23 U/L (ref 7–45)
ANION GAP SERPL CALC-SCNC: 11 MMOL/L (ref 10–20)
AST SERPL W P-5'-P-CCNC: 22 U/L (ref 9–39)
BASOPHILS # BLD AUTO: 0.07 X10*3/UL (ref 0–0.1)
BASOPHILS NFR BLD AUTO: 1.2 %
BILIRUB SERPL-MCNC: 0.5 MG/DL (ref 0–1.2)
BUN SERPL-MCNC: 12 MG/DL (ref 6–23)
CALCIUM SERPL-MCNC: 8.8 MG/DL (ref 8.6–10.3)
CHLORIDE SERPL-SCNC: 101 MMOL/L (ref 98–107)
CHOLEST SERPL-MCNC: 156 MG/DL (ref 0–199)
CHOLESTEROL/HDL RATIO: 2.1
CO2 SERPL-SCNC: 27 MMOL/L (ref 21–32)
CREAT SERPL-MCNC: 0.79 MG/DL (ref 0.5–1.05)
EGFRCR SERPLBLD CKD-EPI 2021: 83 ML/MIN/1.73M*2
EOSINOPHIL # BLD AUTO: 1.13 X10*3/UL (ref 0–0.7)
EOSINOPHIL NFR BLD AUTO: 19.9 %
ERYTHROCYTE [DISTWIDTH] IN BLOOD BY AUTOMATED COUNT: 12.7 % (ref 11.5–14.5)
GLUCOSE SERPL-MCNC: 85 MG/DL (ref 74–99)
HCT VFR BLD AUTO: 43.1 % (ref 36–46)
HDLC SERPL-MCNC: 73.8 MG/DL
HGB BLD-MCNC: 13.9 G/DL (ref 12–16)
IMM GRANULOCYTES # BLD AUTO: 0.01 X10*3/UL (ref 0–0.7)
IMM GRANULOCYTES NFR BLD AUTO: 0.2 % (ref 0–0.9)
LDLC SERPL CALC-MCNC: 63 MG/DL
LYMPHOCYTES # BLD AUTO: 1.79 X10*3/UL (ref 1.2–4.8)
LYMPHOCYTES NFR BLD AUTO: 31.6 %
MAGNESIUM SERPL-MCNC: 2.24 MG/DL (ref 1.6–2.4)
MCH RBC QN AUTO: 29.3 PG (ref 26–34)
MCHC RBC AUTO-ENTMCNC: 32.3 G/DL (ref 32–36)
MCV RBC AUTO: 91 FL (ref 80–100)
MONOCYTES # BLD AUTO: 0.4 X10*3/UL (ref 0.1–1)
MONOCYTES NFR BLD AUTO: 7.1 %
NEUTROPHILS # BLD AUTO: 2.27 X10*3/UL (ref 1.2–7.7)
NEUTROPHILS NFR BLD AUTO: 40 %
NON HDL CHOLESTEROL: 82 MG/DL (ref 0–149)
NRBC BLD-RTO: 0 /100 WBCS (ref 0–0)
PLATELET # BLD AUTO: 358 X10*3/UL (ref 150–450)
POTASSIUM SERPL-SCNC: 4.3 MMOL/L (ref 3.5–5.3)
PROT SERPL-MCNC: 6.5 G/DL (ref 6.4–8.2)
RBC # BLD AUTO: 4.75 X10*6/UL (ref 4–5.2)
SODIUM SERPL-SCNC: 135 MMOL/L (ref 136–145)
TRIGL SERPL-MCNC: 98 MG/DL (ref 0–149)
TSH SERPL-ACNC: 1.84 MIU/L (ref 0.44–3.98)
VIT B12 SERPL-MCNC: 571 PG/ML (ref 211–911)
VLDL: 20 MG/DL (ref 0–40)
WBC # BLD AUTO: 5.7 X10*3/UL (ref 4.4–11.3)

## 2024-02-10 PROCEDURE — 85025 COMPLETE CBC W/AUTO DIFF WBC: CPT

## 2024-02-10 PROCEDURE — 82306 VITAMIN D 25 HYDROXY: CPT

## 2024-02-10 PROCEDURE — 80061 LIPID PANEL: CPT

## 2024-02-10 PROCEDURE — 84443 ASSAY THYROID STIM HORMONE: CPT

## 2024-02-10 PROCEDURE — 80053 COMPREHEN METABOLIC PANEL: CPT

## 2024-02-10 PROCEDURE — 82607 VITAMIN B-12: CPT

## 2024-02-10 PROCEDURE — 83735 ASSAY OF MAGNESIUM: CPT

## 2024-02-10 PROCEDURE — 36415 COLL VENOUS BLD VENIPUNCTURE: CPT

## 2024-02-15 ENCOUNTER — TELEPHONE (OUTPATIENT)
Dept: PRIMARY CARE | Facility: CLINIC | Age: 69
End: 2024-02-15
Payer: MEDICARE

## 2024-02-15 NOTE — RESULT ENCOUNTER NOTE
Vitamin d levels are a bit low, I would recommend taking 2,000 unit supplement per day for this, all other labs are normal.

## 2024-02-15 NOTE — TELEPHONE ENCOUNTER
----- Message from Nataly Zhou APRN-CNP sent at 2/15/2024  7:48 AM EST -----  Vitamin d levels are a bit low, I would recommend taking 2,000 unit supplement per day for this, all other labs are normal.

## 2024-04-10 ENCOUNTER — OFFICE VISIT (OUTPATIENT)
Dept: PRIMARY CARE | Facility: CLINIC | Age: 69
End: 2024-04-10
Payer: MEDICARE

## 2024-04-10 VITALS
HEIGHT: 59 IN | BODY MASS INDEX: 22.02 KG/M2 | OXYGEN SATURATION: 96 % | DIASTOLIC BLOOD PRESSURE: 81 MMHG | SYSTOLIC BLOOD PRESSURE: 118 MMHG | HEART RATE: 81 BPM

## 2024-04-10 DIAGNOSIS — F41.9 MILD ANXIETY: ICD-10-CM

## 2024-04-10 DIAGNOSIS — M54.16 LUMBAR RADICULAR PAIN: Primary | ICD-10-CM

## 2024-04-10 DIAGNOSIS — Z79.899 MEDICATION MANAGEMENT: ICD-10-CM

## 2024-04-10 DIAGNOSIS — Z00.00 MEDICARE ANNUAL WELLNESS VISIT, SUBSEQUENT: ICD-10-CM

## 2024-04-10 PROCEDURE — 1123F ACP DISCUSS/DSCN MKR DOCD: CPT | Performed by: FAMILY MEDICINE

## 2024-04-10 PROCEDURE — 99213 OFFICE O/P EST LOW 20 MIN: CPT | Performed by: FAMILY MEDICINE

## 2024-04-10 PROCEDURE — 1158F ADVNC CARE PLAN TLK DOCD: CPT | Performed by: FAMILY MEDICINE

## 2024-04-10 PROCEDURE — 1159F MED LIST DOCD IN RCRD: CPT | Performed by: FAMILY MEDICINE

## 2024-04-10 PROCEDURE — 1170F FXNL STATUS ASSESSED: CPT | Performed by: FAMILY MEDICINE

## 2024-04-10 PROCEDURE — 1036F TOBACCO NON-USER: CPT | Performed by: FAMILY MEDICINE

## 2024-04-10 PROCEDURE — G0439 PPPS, SUBSEQ VISIT: HCPCS | Performed by: FAMILY MEDICINE

## 2024-04-10 ASSESSMENT — ACTIVITIES OF DAILY LIVING (ADL)
DRESSING: INDEPENDENT
TAKING_MEDICATION: INDEPENDENT
BATHING: INDEPENDENT
MANAGING_FINANCES: INDEPENDENT
DOING_HOUSEWORK: INDEPENDENT
GROCERY_SHOPPING: INDEPENDENT

## 2024-04-10 ASSESSMENT — ENCOUNTER SYMPTOMS
DYSPHORIC MOOD: 0
MYALGIAS: 1
DEPRESSION: 0
DIZZINESS: 0
COUGH: 0
NERVOUS/ANXIOUS: 0
NAUSEA: 0
ARTHRALGIAS: 1
CHILLS: 0
BACK PAIN: 1
VOMITING: 0
WEAKNESS: 0
LOSS OF SENSATION IN FEET: 0
FEVER: 0
FATIGUE: 0
SHORTNESS OF BREATH: 0
HEADACHES: 0
DIARRHEA: 0
PALPITATIONS: 0
CONSTIPATION: 0
OCCASIONAL FEELINGS OF UNSTEADINESS: 0
ABDOMINAL PAIN: 0

## 2024-04-10 NOTE — PROGRESS NOTES
Subjective   Reason for Visit: Elin Randhawa is an 68 y.o. female here for a Medicare Wellness visit.     Past Medical, Surgical, and Family History reviewed and updated in chart.    Reviewed all medications by prescribing practitioner or clinical pharmacist (such as prescriptions, OTCs, herbal therapies and supplements) and documented in the medical record.      Low back pain- chronic, intermittent, has tried acupuncture and massage that does help but due to expense was not able to continue, xrays completed showed degenerative changes. Has been to pain management in the past and received spine injection which patient reports worked on right side but not as well on the left. Does not wish to pursue chiropractor or physical therapy at this time.     Bilateral knee pain-xrays taken 2023 which show degenerative changes, uses knee braces that do help, discussed conservative measures     Anxiety-takes clonazepam daily for sleep and anxiety and is working well for her. CSA and UDS updated today.        OARRS:  Nataly Zhou, JESS-CNP on 4/10/2024 12:24 PM  I have personally reviewed the OARRS report for Elin Randhawa. I have considered the risks of abuse, dependence, addiction and diversion and I believe that it is clinically appropriate for Elin Randhawa to be prescribed this medication    Is the patient prescribed a combination of a benzodiazepine and opioid?  No    Last Urine Drug Screen / ordered today: No  Recent Results (from the past 8760 hour(s))   Drug Screen, Urine With Reflex to Confirmation    Collection Time: 01/10/24  8:56 AM   Result Value Ref Range    Amphetamine Screen, Urine Presumptive Negative Presumptive Negative    Barbiturate Screen, Urine Presumptive Negative Presumptive Negative    Benzodiazepines Screen, Urine Presumptive Negative Presumptive Negative    Cannabinoid Screen, Urine Presumptive Negative Presumptive Negative    Cocaine Metabolite Screen, Urine Presumptive Negative  "Presumptive Negative    Fentanyl Screen, Urine Presumptive Negative Presumptive Negative    Opiate Screen, Urine Presumptive Negative Presumptive Negative    Oxycodone Screen, Urine Presumptive Negative Presumptive Negative    PCP Screen, Urine Presumptive Negative Presumptive Negative     Results are as expected.         Controlled Substance Agreement:  Date of the Last Agreement: 1/10/24  Reviewed Controlled Substance Agreement including but not limited to the benefits, risks, and alternatives to treatment with a Controlled Substance medication(s).    Benzodiazepines:  What is the patient's goal of therapy? To reduce anxiety   Is this being achieved with current treatment? yes    KARINA-7:  No data recorded    Activities of Daily Living:   Is your overall impression that this patient is benefiting (symptom reduction outweighs side effects) from benzodiazepine therapy? Yes     1. Physical Functioning: Better  2. Family Relationship: Better  3. Social Relationship: Better  4. Mood: Better  5. Sleep Patterns: Better  6. Overall Function: Better    Patient Care Team:  KIRILL Lanier as PCP - General (Family Medicine)  KIRILL Westbrook as PCP - [employer]vantage PCP     Review of Systems   Constitutional:  Negative for chills, fatigue and fever.   Eyes:  Negative for visual disturbance.   Respiratory:  Negative for cough and shortness of breath.    Cardiovascular:  Negative for chest pain and palpitations.   Gastrointestinal:  Negative for abdominal pain, constipation, diarrhea, nausea and vomiting.   Musculoskeletal:  Positive for arthralgias, back pain and myalgias.   Skin:  Negative for rash.   Neurological:  Negative for dizziness, syncope, weakness and headaches.   Psychiatric/Behavioral:  Negative for dysphoric mood. The patient is not nervous/anxious.        Objective   Vitals:  /81   Pulse 81   Ht 1.499 m (4' 11\")   SpO2 96%   BMI 22.02 kg/m²       Physical " Exam  Constitutional:       Appearance: Normal appearance.   HENT:      Head: Normocephalic and atraumatic.   Cardiovascular:      Rate and Rhythm: Normal rate and regular rhythm.      Heart sounds: No murmur heard.     No gallop.   Pulmonary:      Effort: Pulmonary effort is normal. No respiratory distress.      Breath sounds: Normal breath sounds.   Abdominal:      General: Bowel sounds are normal. There is no distension.      Tenderness: There is no abdominal tenderness.   Musculoskeletal:      Lumbar back: Positive left straight leg raise test.   Skin:     General: Skin is warm and dry.      Findings: No lesion or rash.   Neurological:      General: No focal deficit present.      Mental Status: She is alert and oriented to person, place, and time. Mental status is at baseline.   Psychiatric:         Mood and Affect: Mood normal.         Behavior: Behavior normal.         Assessment/Plan   Problem List Items Addressed This Visit       Lumbar radicular pain - Primary    Relevant Orders    Referral to Pain Medicine    Follow Up In [facility] - PCP - Established    Mild anxiety    Relevant Orders    Follow Up In [facility] - PCP - Established     Other Visit Diagnoses       Medication management        Relevant Orders    Follow Up In [facility] - PCP - Established    Medicare annual wellness visit, subsequent

## 2024-04-24 DIAGNOSIS — F41.9 MILD ANXIETY: ICD-10-CM

## 2024-04-24 RX ORDER — CLONAZEPAM 1 MG/1
1 TABLET ORAL 2 TIMES DAILY
Qty: 180 TABLET | Refills: 0 | Status: SHIPPED | OUTPATIENT
Start: 2024-04-24

## 2024-04-24 NOTE — TELEPHONE ENCOUNTER
Needs a refill on her Clonazepam 1 mg takes it 2 times a day please send to her mail order for 90 days

## 2024-06-07 DIAGNOSIS — G25.81 RESTLESS LEGS SYNDROME: ICD-10-CM

## 2024-06-07 NOTE — TELEPHONE ENCOUNTER
Med Refill   rOPINIRole (Requip) 1 mg tablet [[id number]]       [facility] - [place], [place] - [address]  [address] [facility], [facility] [address]  Phone: [phone]  Fax: [phone]  JORGE #: --     LOV: 4/10/24    NOV: 7/10/24

## 2024-06-10 RX ORDER — ROPINIROLE 1 MG/1
1 TABLET, FILM COATED ORAL 3 TIMES DAILY
Qty: 270 TABLET | Refills: 1 | Status: SHIPPED | OUTPATIENT
Start: 2024-06-10 | End: 2024-12-07

## 2024-06-27 ENCOUNTER — OFFICE VISIT (OUTPATIENT)
Dept: PRIMARY CARE | Facility: CLINIC | Age: 69
End: 2024-06-27
Payer: MEDICARE

## 2024-06-27 VITALS
DIASTOLIC BLOOD PRESSURE: 80 MMHG | OXYGEN SATURATION: 97 % | HEART RATE: 82 BPM | WEIGHT: 106.2 LBS | BODY MASS INDEX: 21.41 KG/M2 | HEIGHT: 59 IN | SYSTOLIC BLOOD PRESSURE: 124 MMHG

## 2024-06-27 DIAGNOSIS — M54.16 LUMBAR RADICULOPATHY: ICD-10-CM

## 2024-06-27 DIAGNOSIS — M25.562 CHRONIC PAIN OF LEFT KNEE: ICD-10-CM

## 2024-06-27 DIAGNOSIS — M54.42 ACUTE LEFT-SIDED LOW BACK PAIN WITH LEFT-SIDED SCIATICA: Primary | ICD-10-CM

## 2024-06-27 DIAGNOSIS — G89.29 CHRONIC PAIN OF LEFT KNEE: ICD-10-CM

## 2024-06-27 RX ORDER — TRIAMCINOLONE ACETONIDE 40 MG/ML
40 INJECTION, SUSPENSION INTRA-ARTICULAR; INTRAMUSCULAR
Status: COMPLETED | OUTPATIENT
Start: 2024-06-27 | End: 2024-06-27

## 2024-06-27 RX ORDER — LIDOCAINE HYDROCHLORIDE 10 MG/ML
1 INJECTION INFILTRATION; PERINEURAL
Status: COMPLETED | OUTPATIENT
Start: 2024-06-27 | End: 2024-06-27

## 2024-06-27 RX ORDER — LIDOCAINE 50 MG/G
OINTMENT TOPICAL AS NEEDED
Qty: 50 G | Refills: 3 | Status: SHIPPED | OUTPATIENT
Start: 2024-06-27 | End: 2025-06-27

## 2024-06-27 RX ORDER — GABAPENTIN 100 MG/1
100 CAPSULE ORAL 3 TIMES DAILY
Qty: 45 CAPSULE | Refills: 0 | Status: SHIPPED | OUTPATIENT
Start: 2024-06-27 | End: 2024-07-12

## 2024-06-27 RX ORDER — METHYLPREDNISOLONE 4 MG/1
4 TABLET ORAL ONCE
COMMUNITY
Start: 2024-06-24

## 2024-06-27 ASSESSMENT — ENCOUNTER SYMPTOMS
OCCASIONAL FEELINGS OF UNSTEADINESS: 0
LOSS OF SENSATION IN FEET: 0
DEPRESSION: 0

## 2024-06-27 NOTE — PROGRESS NOTES
Subjective   Patient ID: Elin Randhawa is a 68 y.o. female who presents for Follow-up (Severe pain on left knee, pain on left lower back started about 6 days ago).    67 y/o female presents for low back pain and left knee pain    Low back pain chronic with flare ups, current flare up started about 6 weeks ago and was seen by a chiropractor for adjustments with no relief of symptoms. Denies any known injury but has been doing more gardening lately. Does endorse progressively worsening numbness and tingling of left leg that is intermittent but increasing in frequency and duration and is not relieved with OTC analgesics including tylenol, ibuprofen, voltaren gel. Also endorses chronic left knee pain with intermittent flare ups with severe pain starting three days ago that prompted her to go to urgent care. Xray of left knee was completed and unremarkable. Was given prednisone taper but reports no improvement in pain since starting. Denies any recent injury to knee. Back pain is sharp and shooting aggravated by activity and laying in bed. Denies loss of bladder function or saddle anesthesia. Knee pain is sharp and does not radiate aggravated by activity.         Patient ID: Elin Randhawa is a 68 y.o. female.    Joint Injection Large/Arthrocentesis on 6/27/2024 9:14 AM  Details: 21 G needle, anterolateral approach  Medications: 40 mg triamcinolone acetonide 40 mg/mL; 1 mL lidocaine 10 mg/mL (1 %)  Outcome: tolerated well, no immediate complications  Procedure, treatment alternatives, risks and benefits explained, specific risks discussed. Consent was given by the patient. Immediately prior to procedure a time out was called to verify the correct patient, procedure, equipment, support staff and site/side marked as required. Patient was prepped and draped in the usual sterile fashion.            Review of Systems   All other systems reviewed and are negative.      Objective   /80   Pulse 82   Ht 1.499 m (4'  "11\")   Wt 48.2 kg (106 lb 3.2 oz)   SpO2 97%   BMI 21.45 kg/m²     Physical Exam  Constitutional:       Appearance: Normal appearance.   Musculoskeletal:      Lumbar back: No tenderness or bony tenderness. Decreased range of motion. Positive left straight leg raise test.      Left knee: No swelling, effusion, erythema or ecchymosis. Normal range of motion. Tenderness present over the medial joint line and lateral joint line.      Comments: Left limping gait, slow to rise from seated position, strongly utilizing right leg to lift off, very limited assistance of left leg observed.    Neurological:      Mental Status: She is alert.      Sensory: Sensory deficit (decreased sensation of left lower extremity along L5-s1 dermatome from lower 1/3 of calf to toes 4 and 5 with monofilament testing.) present.         Assessment/Plan   Problem List Items Addressed This Visit    None  Visit Diagnoses         Codes    Acute left-sided low back pain with left-sided sciatica    -  Primary M54.42    Relevant Medications    gabapentin (Neurontin) 100 mg capsule    lidocaine (Xylocaine) 5 % ointment    Other Relevant Orders    MR lumbar spine wo IV contrast    Chronic pain of left knee     M25.562, G89.29    Relevant Medications    lidocaine (Xylocaine) 5 % ointment    Lumbar radiculopathy     M54.16    Relevant Orders    MR lumbar spine wo IV contrast               "

## 2024-07-01 ENCOUNTER — TELEPHONE (OUTPATIENT)
Dept: PRIMARY CARE | Facility: CLINIC | Age: 69
End: 2024-07-01
Payer: MEDICARE

## 2024-07-02 DIAGNOSIS — M25.562 ARTHRALGIA OF BOTH KNEES: Primary | ICD-10-CM

## 2024-07-02 DIAGNOSIS — M25.561 ARTHRALGIA OF BOTH KNEES: Primary | ICD-10-CM

## 2024-07-02 RX ORDER — PREDNISONE 20 MG/1
TABLET ORAL
Qty: 18 TABLET | Refills: 0 | Status: SHIPPED | OUTPATIENT
Start: 2024-07-02

## 2024-07-10 ENCOUNTER — APPOINTMENT (OUTPATIENT)
Dept: PRIMARY CARE | Facility: CLINIC | Age: 69
End: 2024-07-10
Payer: MEDICARE

## 2024-07-10 DIAGNOSIS — F41.9 MILD ANXIETY: ICD-10-CM

## 2024-07-10 RX ORDER — CLONAZEPAM 1 MG/1
1 TABLET ORAL 2 TIMES DAILY
Qty: 180 TABLET | Refills: 0 | Status: SHIPPED | OUTPATIENT
Start: 2024-07-15

## 2024-07-16 ENCOUNTER — TELEPHONE (OUTPATIENT)
Dept: PRIMARY CARE | Facility: CLINIC | Age: 69
End: 2024-07-16
Payer: MEDICARE

## 2024-07-16 ENCOUNTER — HOSPITAL ENCOUNTER (OUTPATIENT)
Dept: RADIOLOGY | Facility: HOSPITAL | Age: 69
Discharge: HOME | End: 2024-07-16
Payer: MEDICARE

## 2024-07-16 DIAGNOSIS — M54.42 ACUTE LEFT-SIDED LOW BACK PAIN WITH LEFT-SIDED SCIATICA: ICD-10-CM

## 2024-07-16 DIAGNOSIS — G89.29 CHRONIC PAIN OF LEFT KNEE: ICD-10-CM

## 2024-07-16 DIAGNOSIS — M54.16 LUMBAR RADICULOPATHY: ICD-10-CM

## 2024-07-16 DIAGNOSIS — M25.562 CHRONIC PAIN OF LEFT KNEE: ICD-10-CM

## 2024-07-16 PROCEDURE — 72148 MRI LUMBAR SPINE W/O DYE: CPT

## 2024-07-16 PROCEDURE — 72148 MRI LUMBAR SPINE W/O DYE: CPT | Performed by: RADIOLOGY

## 2024-07-16 RX ORDER — LIDOCAINE 50 MG/G
OINTMENT TOPICAL AS NEEDED
Qty: 50 G | Refills: 3 | Status: SHIPPED | OUTPATIENT
Start: 2024-07-16 | End: 2025-07-16

## 2024-07-16 RX ORDER — LIDOCAINE 50 MG/G
OINTMENT TOPICAL AS NEEDED
Qty: 50 G | Refills: 3 | Status: SHIPPED | OUTPATIENT
Start: 2024-07-16 | End: 2024-07-16 | Stop reason: SDUPTHER

## 2024-07-17 ENCOUNTER — TELEPHONE (OUTPATIENT)
Dept: PRIMARY CARE | Facility: CLINIC | Age: 69
End: 2024-07-17
Payer: MEDICARE

## 2024-07-17 NOTE — RESULT ENCOUNTER NOTE
Arthritic changes in low back that are similar in appearance from last MRI. If still having pain, I would recommend physical therapy or a referral to pain management for evaluation for spinal injections.

## 2024-07-17 NOTE — TELEPHONE ENCOUNTER
----- Message from Nataly Zhou sent at 7/17/2024  3:02 PM EDT -----  Arthritic changes in low back that are similar in appearance from last MRI. If still having pain, I would recommend physical therapy or a referral to pain management for evaluation for spinal injections.

## 2024-07-23 ENCOUNTER — TELEPHONE (OUTPATIENT)
Dept: PRIMARY CARE | Facility: CLINIC | Age: 69
End: 2024-07-23
Payer: MEDICARE

## 2024-07-23 DIAGNOSIS — F41.9 MILD ANXIETY: Primary | ICD-10-CM

## 2024-07-23 RX ORDER — CLONAZEPAM 1 MG/1
1 TABLET ORAL 2 TIMES DAILY
Qty: 14 TABLET | Refills: 0 | Status: SHIPPED | OUTPATIENT
Start: 2024-07-23 | End: 2024-07-30

## 2024-07-23 NOTE — TELEPHONE ENCOUNTER
Patient hasn't received prescription for Clonazepam from [facility] and would like to know if you can send a few days worth to [facility]

## 2024-07-24 DIAGNOSIS — F41.9 MILD ANXIETY: ICD-10-CM

## 2024-07-24 RX ORDER — CLONAZEPAM 1 MG/1
1 TABLET ORAL 2 TIMES DAILY
Qty: 180 TABLET | Refills: 1 | Status: SHIPPED | OUTPATIENT
Start: 2024-07-24 | End: 2025-01-20

## 2024-08-06 DIAGNOSIS — M51.36 DEGENERATIVE DISC DISEASE, LUMBAR: Primary | ICD-10-CM

## 2024-08-06 DIAGNOSIS — M54.16 LUMBAR RADICULAR PAIN: ICD-10-CM

## 2024-09-10 ENCOUNTER — TELEPHONE (OUTPATIENT)
Dept: PRIMARY CARE | Facility: CLINIC | Age: 69
End: 2024-09-10
Payer: MEDICARE

## 2024-09-10 DIAGNOSIS — G25.81 RESTLESS LEGS SYNDROME: ICD-10-CM

## 2024-09-10 RX ORDER — ROPINIROLE 1 MG/1
1 TABLET, FILM COATED ORAL 3 TIMES DAILY
Qty: 270 TABLET | Refills: 0 | Status: SHIPPED | OUTPATIENT
Start: 2024-09-10 | End: 2025-03-09

## 2024-09-10 NOTE — TELEPHONE ENCOUNTER
Med Refill   rOPINIRole (Requip) 1 mg tablet [[id number]]     [facility] - [place], [place] - [address]  [address], [place] [address]  Phone: [phone]  Fax: [phone]  JORGE #: --

## 2024-10-02 ENCOUNTER — APPOINTMENT (OUTPATIENT)
Dept: INTEGRATIVE MEDICINE | Facility: CLINIC | Age: 69
End: 2024-10-02
Payer: MEDICARE

## 2024-10-02 DIAGNOSIS — M54.59 MECHANICAL LOW BACK PAIN: Primary | ICD-10-CM

## 2024-10-02 DIAGNOSIS — M54.16 LUMBAR RADICULAR PAIN: ICD-10-CM

## 2024-10-02 PROCEDURE — 97810 ACUP 1/> WO ESTIM 1ST 15 MIN: CPT | Performed by: ACUPUNCTURIST

## 2024-10-02 PROCEDURE — 97811 ACUP 1/> W/O ESTIM EA ADD 15: CPT | Performed by: ACUPUNCTURIST

## 2024-10-02 PROCEDURE — 99202 OFFICE O/P NEW SF 15 MIN: CPT | Performed by: ACUPUNCTURIST

## 2024-10-02 NOTE — PROGRESS NOTES
"Acupuncture Visit:     Subjective   Patient ID: Elin Randhawa is a 68 y.o. female who presents for No chief complaint on file.  [facility] [place] [place] [facility] - 12/29/22 2024 Acupuncture Benefits  09/24/2024 RA  [employer]vantage  12 visits in 90 days + add 8 : [facility]    Notes L sided LBP x 5 yrs, worse in June 2024.  States she was travelling to [place] in April/May combined with gardening pain got worse.  Notes she had Rx oral steroid at the time some relief  Pain is constant, feels sore  Worse at night, sitting too long then getting up   Better walking, stretching, ibuprofen/tylenol  when gets bad 2x/wk  L knee pain outer knee, sometime medial - had a cortisone shot in the summer  Hears a \"pop\"   Denies surgery, no injury,  Pain level 4/10-worst increase to 7-8/10 this is when she takes OTC pn meds    LEFT top shoulder pain (front) and neck pain started last year  She is not sure what triggers the pain    Neck pain feels sore   Feels a \"pop\" when extends neck.    States all organs intacts            Session Information  Is this acupuncture treatment being billed to the patient's insurance company: Yes  This is visit number: 1  The patient has a total number of visits of: 12  Initial Acupuncture Treatment date: 10/02/24  Name of Insurance Company: [employer] 12+ 8 ADD : [facility]  Visit Type: New patient         Review of Systems         Provider reviewed plan for the acupuncture session, precautions and contraindications. Patient/guardian/hospital staff has given consent to treat with full understanding of what to expect during the session. Before acupuncture began, provider explained to the patient to communicate at any time if the procedure was causing discomfort past their tolerance level. Patient agreed to advise acupuncturist. The acupuncturist counseled the patient on the risks of acupuncture treatment including pain, infection, bleeding, and no relief of pain. The patient was positioned comfortably. There was no " evidence of infection at the site of needle insertions.    Objective   Physical Exam              Acupuncture Treatment  Body Points - Left: GB 20, 21, 34,  ST 36, Kd 10, YY, [place]  Body Points - Bilateral: Kd 3, Lv 3, SP 6, UB 13, 18, 19, 25, 27, 62  Needle Count In: 26  Needle Count Out: 26              Assessment/Plan

## 2024-10-09 ENCOUNTER — APPOINTMENT (OUTPATIENT)
Dept: PRIMARY CARE | Facility: CLINIC | Age: 69
End: 2024-10-09
Payer: MEDICARE

## 2024-10-09 VITALS
OXYGEN SATURATION: 94 % | SYSTOLIC BLOOD PRESSURE: 122 MMHG | HEART RATE: 59 BPM | DIASTOLIC BLOOD PRESSURE: 76 MMHG | BODY MASS INDEX: 22.09 KG/M2 | HEIGHT: 59 IN | WEIGHT: 109.6 LBS

## 2024-10-09 DIAGNOSIS — F41.9 MILD ANXIETY: ICD-10-CM

## 2024-10-09 DIAGNOSIS — Z23 NEED FOR INFLUENZA VACCINATION: Primary | ICD-10-CM

## 2024-10-09 DIAGNOSIS — Z00.00 HEALTH CARE MAINTENANCE: ICD-10-CM

## 2024-10-09 DIAGNOSIS — M54.16 LUMBAR RADICULAR PAIN: ICD-10-CM

## 2024-10-09 PROCEDURE — 1036F TOBACCO NON-USER: CPT

## 2024-10-09 PROCEDURE — 1159F MED LIST DOCD IN RCRD: CPT

## 2024-10-09 PROCEDURE — 1160F RVW MEDS BY RX/DR IN RCRD: CPT

## 2024-10-09 PROCEDURE — 90662 IIV NO PRSV INCREASED AG IM: CPT

## 2024-10-09 PROCEDURE — 3008F BODY MASS INDEX DOCD: CPT

## 2024-10-09 PROCEDURE — 1123F ACP DISCUSS/DSCN MKR DOCD: CPT

## 2024-10-09 PROCEDURE — 99213 OFFICE O/P EST LOW 20 MIN: CPT

## 2024-10-09 PROCEDURE — G0008 ADMIN INFLUENZA VIRUS VAC: HCPCS

## 2024-10-09 RX ORDER — CLONAZEPAM 1 MG/1
1 TABLET ORAL 2 TIMES DAILY
Qty: 180 TABLET | Refills: 1 | Status: SHIPPED | OUTPATIENT
Start: 2024-10-09 | End: 2025-04-07

## 2024-10-09 RX ORDER — NAPROXEN 500 MG/1
500 TABLET ORAL 2 TIMES DAILY PRN
Qty: 60 TABLET | Refills: 0 | Status: SHIPPED | OUTPATIENT
Start: 2024-10-09 | End: 2025-01-07

## 2024-10-09 ASSESSMENT — ANXIETY QUESTIONNAIRES
GAD7 TOTAL SCORE: 4
4. TROUBLE RELAXING: MORE THAN HALF THE DAYS
1. FEELING NERVOUS, ANXIOUS, OR ON EDGE: NOT AT ALL
3. WORRYING TOO MUCH ABOUT DIFFERENT THINGS: NOT AT ALL
5. BEING SO RESTLESS THAT IT IS HARD TO SIT STILL: NOT AT ALL
7. FEELING AFRAID AS IF SOMETHING AWFUL MIGHT HAPPEN: SEVERAL DAYS
IF YOU CHECKED OFF ANY PROBLEMS ON THIS QUESTIONNAIRE, HOW DIFFICULT HAVE THESE PROBLEMS MADE IT FOR YOU TO DO YOUR WORK, TAKE CARE OF THINGS AT HOME, OR GET ALONG WITH OTHER PEOPLE: NOT DIFFICULT AT ALL
2. NOT BEING ABLE TO STOP OR CONTROL WORRYING: SEVERAL DAYS
6. BECOMING EASILY ANNOYED OR IRRITABLE: NOT AT ALL

## 2024-10-09 ASSESSMENT — ENCOUNTER SYMPTOMS
DEPRESSION: 0
ARTHRALGIAS: 1
NERVOUS/ANXIOUS: 1
LOSS OF SENSATION IN FEET: 0
OCCASIONAL FEELINGS OF UNSTEADINESS: 0
BACK PAIN: 1

## 2024-10-09 NOTE — PATIENT INSTRUCTIONS
Assessment/Plan   Problem List Items Addressed This Visit       Lumbar radicular pain    Relevant Medications    naproxen (Naprosyn) 500 mg tablet    Mild anxiety    Relevant Medications    clonazePAM (KlonoPIN) 1 mg tablet    Other Relevant Orders    Follow Up In [facility] - PCP - Established     Other Visit Diagnoses       Need for influenza vaccination    -  Primary    Relevant Orders    Flu vaccine, trivalent, preservative free, HIGH-DOSE, age 65y+ (Fluzone)    Health care maintenance        Relevant Orders    Follow Up In [facility] - PCP - Medicare Annual

## 2024-10-09 NOTE — PROGRESS NOTES
Subjective   Patient ID: Elin Randhawa is a 68 y.o. female who presents for Follow-up (Follow up, refill, discuss left lower back/left knee/left shoulder pain.).    Past Medical, Surgical, and Family History reviewed and updated in chart.     Reviewed all medications by prescribing practitioner or clinical pharmacist (such as prescriptions, OTCs, herbal therapies and supplements) and documented in the medical record.  OARRS:  JESS Burciaga-JUDITH on 10/9/2024 11:58 AM  I have personally reviewed the OARRS report for Elin Randhawa. I have considered the risks of abuse, dependence, addiction and diversion    Is the patient prescribed a combination of a benzodiazepine and opioid?  No    Last Urine Drug Screen / ordered today: No  Recent Results (from the past 8760 hour(s))   Drug Screen, Urine With Reflex to Confirmation    Collection Time: 01/10/24  8:56 AM   Result Value Ref Range    Amphetamine Screen, Urine Presumptive Negative Presumptive Negative    Barbiturate Screen, Urine Presumptive Negative Presumptive Negative    Benzodiazepines Screen, Urine Presumptive Negative Presumptive Negative    Cannabinoid Screen, Urine Presumptive Negative Presumptive Negative    Cocaine Metabolite Screen, Urine Presumptive Negative Presumptive Negative    Fentanyl Screen, Urine Presumptive Negative Presumptive Negative    Opiate Screen, Urine Presumptive Negative Presumptive Negative    Oxycodone Screen, Urine Presumptive Negative Presumptive Negative    PCP Screen, Urine Presumptive Negative Presumptive Negative     Results are as expected.         Controlled Substance Agreement:  Date of the Last Agreement: 1/10/24  Reviewed Controlled Substance Agreement including but not limited to the benefits, risks, and alternatives to treatment with a Controlled Substance medication(s).    Benzodiazepines:  What is the patient's goal of therapy? Decrease anxiety and panic  Is this being achieved with current treatment?  "yes    KARINA-7:  Over the last 2 weeks, how often have you been bothered by any of the following problems?  Feeling nervous, anxious, or on edge: 0  Not being able to stop or control worryin  Worrying too much about different things: 0  Trouble relaxin  Being so restless that it is hard to sit still: 0  Becoming easily annoyed or irritable: 0  Feeling afraid as if something awful might happen: 1  KARINA-7 Total Score: 4        Activities of Daily Living:   Is your overall impression that this patient is benefiting (symptom reduction outweighs side effects) from benzodiazepine therapy? Yes     1. Physical Functioning: Better  2. Family Relationship: Better  3. Social Relationship: Better  4. Mood: Better  5. Sleep Patterns: Better  6. Overall Function: Better    HPI   68 yof in office for follow up visit for anxiety.  Remains to have Lower back and knees, has pain upper back and shoulder. Pain is on and off, for the past year. [facility] will go for [facility]-6 [facility].       Review of Systems   Musculoskeletal:  Positive for arthralgias and back pain.        See HPI   Psychiatric/Behavioral:  The patient is nervous/anxious.        Objective   /76   Pulse 59   Ht 1.499 m (4' 11.02\")   Wt 49.7 kg (109 lb 9.6 oz)   SpO2 94%   BMI 22.12 kg/m²     Physical Exam  Constitutional:       Appearance: Normal appearance.   HENT:      Head: Normocephalic and atraumatic.   Cardiovascular:      Rate and Rhythm: Normal rate and regular rhythm.      Pulses: Normal pulses.      Heart sounds: Normal heart sounds.   Pulmonary:      Effort: Pulmonary effort is normal.      Breath sounds: Normal breath sounds.   Skin:     General: Skin is warm and dry.   Neurological:      General: No focal deficit present.      Mental Status: She is alert and oriented to person, place, and time.   Psychiatric:         Mood and Affect: Mood normal.         Behavior: Behavior normal.        "  Thought Content: Thought content normal.         Judgment: Judgment normal.         Assessment/Plan   Problem List Items Addressed This Visit       Lumbar radicular pain    Relevant Medications    naproxen (Naprosyn) 500 mg tablet    Mild anxiety    Relevant Medications    clonazePAM (KlonoPIN) 1 mg tablet    Other Relevant Orders    Follow Up In [facility] - PCP - Established     Other Visit Diagnoses       Need for influenza vaccination    -  Primary    Relevant Orders    Flu vaccine, trivalent, preservative free, HIGH-DOSE, age 65y+ (Fluzone) (Completed)    Health care maintenance        Relevant Orders    Follow Up In [facility] - PCP - Medicare Annual

## 2024-10-30 ENCOUNTER — APPOINTMENT (OUTPATIENT)
Dept: INTEGRATIVE MEDICINE | Facility: CLINIC | Age: 69
End: 2024-10-30
Payer: MEDICARE

## 2024-10-30 DIAGNOSIS — M54.59 MECHANICAL LOW BACK PAIN: Primary | ICD-10-CM

## 2024-10-30 PROCEDURE — 97810 ACUP 1/> WO ESTIM 1ST 15 MIN: CPT | Performed by: ACUPUNCTURIST

## 2024-10-30 PROCEDURE — 97811 ACUP 1/> W/O ESTIM EA ADD 15: CPT | Performed by: ACUPUNCTURIST

## 2024-11-07 ENCOUNTER — APPOINTMENT (OUTPATIENT)
Dept: INTEGRATIVE MEDICINE | Facility: CLINIC | Age: 69
End: 2024-11-07
Payer: MEDICARE

## 2024-11-07 DIAGNOSIS — M54.59 OTHER LOW BACK PAIN: Primary | ICD-10-CM

## 2024-11-07 PROCEDURE — 97810 ACUP 1/> WO ESTIM 1ST 15 MIN: CPT | Performed by: ACUPUNCTURIST

## 2024-11-07 PROCEDURE — 97811 ACUP 1/> W/O ESTIM EA ADD 15: CPT | Performed by: ACUPUNCTURIST

## 2024-11-07 NOTE — PROGRESS NOTES
"Acupuncture Visit:     Subjective   Patient ID: Elin Randhawa is a 68 y.o. female who presents for Back Pain  Pt was gardening yesterday and feel stiff and sore.   She continues to have left sided knee pain with limited ROM in flexion.  She continues to have left sided buttock pain.    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  Movements help her feel loose.  Previous:  No change in pain  Could not get an appt after 1st tx  Pain when getting up after gardening.  Notes when she took a walk, less than a mile the pain started  Radiates down L upper buttocks to outer hip and outer leg (GB)  LEFT shoulder with pain at night when rolls over. Sleeps on sides, occ on back.     Initial  Notes L sided LBP x 5 yrs, worse in June 2024.  States she was travelling to [place] in April/May combined with gardening pain got worse.  Notes she had Rx oral steroid at the time some relief  Pain is constant, feels sore  Worse at night, sitting too long then getting up   Better walking, stretching, ibuprofen/tylenol  when gets bad 2x/wk  L knee pain outer knee, sometime medial - had a cortisone shot in the summer  Hears a \"pop\"   Denies surgery, no injury,  Pain level 4/10-worst increase to 7-8/10 this is when she takes OTC pn meds     LEFT top shoulder pain (front) and neck pain started last year  She is not sure what triggers the pain     Neck pain feels sore   Feels a \"pop\" when extends neck.           Session Information  This is visit number: 3  The patient has a total number of visits of: 12  Initial Acupuncture Treatment date: 10/02/24  Name of Insurance Company: [place] Med Adv 12+ 8 ADD : V[employer]Y  Visit Type: Follow-up visit  Medical History Reviewed: I have reviewed pertinent medical history in EHR, and no contraindications are present to provide treatment         Review of Systems         Provider reviewed plan for the acupuncture session, precautions and contraindications. Patient/guardian/hospital staff " has given consent to treat with full understanding of what to expect during the session. Before acupuncture began, provider explained to the patient to communicate at any time if the procedure was causing discomfort past their tolerance level. Patient agreed to advise acupuncturist. The acupuncturist counseled the patient on the risks of acupuncture treatment including pain, infection, bleeding, and no relief of pain. The patient was positioned comfortably. There was no evidence of infection at the site of needle insertions.    Objective   Physical Exam         Treatment Plan  Treatment Goals: Pain management    Acupuncture Treatment  Needle Guage: 40 guage /.16/ Red seirin, 42 guage /.14/ Lime green seirin, 32 guage /.25/ Purple seirin  Body Points: With retention  Body Points - Bilateral: sp3.2, gb 38, si jtx2, gb30, ub 22, 23,24, hjj L2,3  Acupuncture Treatment Change: Same treatment as above with needles removed:  TDP Lamp Descripton: leo men  Needle Count In: 20  Needle Count Out: 20  Needle Retention Time (min): 25 minutes  Total Face to Face Time (min): 25 minutes              Assessment/Plan

## 2024-11-14 ENCOUNTER — APPOINTMENT (OUTPATIENT)
Dept: INTEGRATIVE MEDICINE | Facility: CLINIC | Age: 69
End: 2024-11-14
Payer: MEDICARE

## 2024-11-14 DIAGNOSIS — M54.59 OTHER LOW BACK PAIN: Primary | ICD-10-CM

## 2024-11-14 PROCEDURE — 97811 ACUP 1/> W/O ESTIM EA ADD 15: CPT | Performed by: ACUPUNCTURIST

## 2024-11-14 PROCEDURE — 97810 ACUP 1/> WO ESTIM 1ST 15 MIN: CPT | Performed by: ACUPUNCTURIST

## 2024-11-14 NOTE — PROGRESS NOTES
"Acupuncture Visit:     Subjective   Patient ID: Elin Randhawa is a 68 y.o. female who presents for No chief complaint on file.  4/12    States Less LEFT sided pain in low back  Notes sleep improved    prev  Pt was gardening yesterday and feel stiff and sore.   She continues to have left sided knee pain with limited ROM in flexion.  She continues to have left sided buttock pain.    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  Movements help her feel loose.  Previous:  No change in pain  Could not get an appt after 1st tx  Pain when getting up after gardening.  Notes when she took a walk, less than a mile the pain started  Radiates down L upper buttocks to outer hip and outer leg (GB)  LEFT shoulder with pain at night when rolls over. Sleeps on sides, occ on back.     Initial  Notes L sided LBP x 5 yrs, worse in June 2024.  States she was travelling to [place] in April/May combined with gardening pain got worse.  Notes she had Rx oral steroid at the time some relief  Pain is constant, feels sore  Worse at night, sitting too long then getting up   Better walking, stretching, ibuprofen/tylenol  when gets bad 2x/wk  L knee pain outer knee, sometime medial - had a cortisone shot in the summer  Hears a \"pop\"   Denies surgery, no injury,  Pain level 4/10-worst increase to 7-8/10 this is when she takes OTC pn meds     LEFT top shoulder pain (front) and neck pain started last year  She is not sure what triggers the pain     Neck pain feels sore   Feels a \"pop\" when extends neck.             Session Information  Is this acupuncture treatment being billed to the patient's insurance company: Yes  This is visit number: 4  The patient has a total number of visits of: 12  Initial Acupuncture Treatment date: 10/02/24  Name of Insurance Company: [place] Med Adv 12+ 8 ADD : VPCY         Review of Systems         Provider reviewed plan for the acupuncture session, precautions and contraindications. " Patient/guardian/hospital staff has given consent to treat with full understanding of what to expect during the session. Before acupuncture began, provider explained to the patient to communicate at any time if the procedure was causing discomfort past their tolerance level. Patient agreed to advise acupuncturist. The acupuncturist counseled the patient on the risks of acupuncture treatment including pain, infection, bleeding, and no relief of pain. The patient was positioned comfortably. There was no evidence of infection at the site of needle insertions.    Objective   Physical Exam              Acupuncture Treatment  Body Points - Left: GB 34, 39  Body Points - Bilateral: SP 3, ub 18, 19, 22, 23,24, hjj L2,3, Kd 6, Susan 9  TDP Lamp Descripton: leo men with 2 moxa  Needle Count In: 22  Needle Count Out: 22  Total Face to Face Time (min): 25 minutes              Assessment/Plan

## 2024-11-21 ENCOUNTER — APPOINTMENT (OUTPATIENT)
Dept: INTEGRATIVE MEDICINE | Facility: CLINIC | Age: 69
End: 2024-11-21
Payer: MEDICARE

## 2024-11-21 DIAGNOSIS — M54.59 OTHER LOW BACK PAIN: Primary | ICD-10-CM

## 2024-11-21 PROCEDURE — 97811 ACUP 1/> W/O ESTIM EA ADD 15: CPT | Performed by: ACUPUNCTURIST

## 2024-11-21 PROCEDURE — 97810 ACUP 1/> WO ESTIM 1ST 15 MIN: CPT | Performed by: ACUPUNCTURIST

## 2024-11-21 NOTE — PROGRESS NOTES
"Acupuncture Visit:     Subjective   Patient ID: Elin Randhawa is a 68 y.o. female who presents for No chief complaint on file.  5/12    States pain has been stable, other than yesterday due to a lot of gardening.  L sided upper buttocks most pain  Notes sleep off due to worry about upcoming travel  Notes she takes Rx med for mid anxiety  Wakes up at night to urinate  States LEFT shoulder pain    prev  States Less LEFT sided pain in low back  Notes sleep improved    prev  Pt was gardening yesterday and feel stiff and sore.   She continues to have left sided knee pain with limited ROM in flexion.  She continues to have left sided buttock pain.    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  Movements help her feel loose.  Previous:  No change in pain  Could not get an appt after 1st tx  Pain when getting up after gardening.  Notes when she took a walk, less than a mile the pain started  Radiates down L upper buttocks to outer hip and outer leg (GB)  LEFT shoulder with pain at night when rolls over. Sleeps on sides, occ on back.     Initial  Notes L sided LBP x 5 yrs, worse in June 2024.  States she was travelling to [place] in April/May combined with gardening pain got worse.  Notes she had Rx oral steroid at the time some relief  Pain is constant, feels sore  Worse at night, sitting too long then getting up   Better walking, stretching, ibuprofen/tylenol  when gets bad 2x/wk  L knee pain outer knee, sometime medial - had a cortisone shot in the summer  Hears a \"pop\"   Denies surgery, no injury,  Pain level 4/10-worst increase to 7-8/10 this is when she takes OTC pn meds     LEFT top shoulder pain (front) and neck pain started last year  She is not sure what triggers the pain     Neck pain feels sore   Feels a \"pop\" when extends neck.             Session Information  Is this acupuncture treatment being billed to the patient's insurance company: Yes  This is visit number: 5  The patient has a " total number of visits of: 12  Initial Acupuncture Treatment date: 10/02/24  Name of Insurance Company: [place] Med Adv 12+ 8 ADD : VPCY         Review of Systems         Provider reviewed plan for the acupuncture session, precautions and contraindications. Patient/guardian/hospital staff has given consent to treat with full understanding of what to expect during the session. Before acupuncture began, provider explained to the patient to communicate at any time if the procedure was causing discomfort past their tolerance level. Patient agreed to advise acupuncturist. The acupuncturist counseled the patient on the risks of acupuncture treatment including pain, infection, bleeding, and no relief of pain. The patient was positioned comfortably. There was no evidence of infection at the site of needle insertions.    Objective   Physical Exam              Acupuncture Treatment  Body Points - Left: EXB12, GB 30, 34, 39, 40, 44.06, UB 30,31  Body Points - Bilateral: Kd 3, UB 13, 18, 19, 22-26, 60  TDP Lamp Descripton: leo men with 2 moxa  Needle Count In: 28  Needle Count Out: 28  Total Face to Face Time (min): 25 minutes              Assessment/Plan

## 2024-12-05 ENCOUNTER — APPOINTMENT (OUTPATIENT)
Dept: INTEGRATIVE MEDICINE | Facility: CLINIC | Age: 69
End: 2024-12-05
Payer: MEDICARE

## 2024-12-05 DIAGNOSIS — M54.59 OTHER LOW BACK PAIN: Primary | ICD-10-CM

## 2024-12-05 PROCEDURE — 97811 ACUP 1/> W/O ESTIM EA ADD 15: CPT | Performed by: ACUPUNCTURIST

## 2024-12-05 PROCEDURE — 97810 ACUP 1/> WO ESTIM 1ST 15 MIN: CPT | Performed by: ACUPUNCTURIST

## 2024-12-05 NOTE — PROGRESS NOTES
"Acupuncture Visit:     Subjective   Patient ID: Elin Randhawa is a 69 y.o. female who presents for No chief complaint on file.  6/12    States she has been good other than last night.  Notes she put salon paws on and a heating pad    prev  States pain has been stable, other than yesterday due to a lot of gardening.  L sided upper buttocks most pain  Notes sleep off due to worry about upcoming travel  Notes she takes Rx med for mid anxiety  Wakes up at night to urinate  States LEFT shoulder pain    prev  States Less LEFT sided pain in low back  Notes sleep improved    prev  Pt was gardening yesterday and feel stiff and sore.   She continues to have left sided knee pain with limited ROM in flexion.  She continues to have left sided buttock pain.    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  Movements help her feel loose.  Previous:  No change in pain  Could not get an appt after 1st tx  Pain when getting up after gardening.  Notes when she took a walk, less than a mile the pain started  Radiates down L upper buttocks to outer hip and outer leg (GB)  LEFT shoulder with pain at night when rolls over. Sleeps on sides, occ on back.     Initial  Notes L sided LBP x 5 yrs, worse in June 2024.  States she was travelling to [place] in April/May combined with gardening pain got worse.  Notes she had Rx oral steroid at the time some relief  Pain is constant, feels sore  Worse at night, sitting too long then getting up   Better walking, stretching, ibuprofen/tylenol  when gets bad 2x/wk  L knee pain outer knee, sometime medial - had a cortisone shot in the summer  Hears a \"pop\"   Denies surgery, no injury,  Pain level 4/10-worst increase to 7-8/10 this is when she takes OTC pn meds     LEFT top shoulder pain (front) and neck pain started last year  She is not sure what triggers the pain     Neck pain feels sore   Feels a \"pop\" when extends neck.             Session Information  Is this acupuncture " treatment being billed to the patient's insurance company: Yes  This is visit number: 6  The patient has a total number of visits of: 12  Initial Acupuncture Treatment date: 10/02/24  Name of Insurance Company: [place] Med Adv 12+ 8 ADD : VPCY         Review of Systems         Provider reviewed plan for the acupuncture session, precautions and contraindications. Patient/guardian/hospital staff has given consent to treat with full understanding of what to expect during the session. Before acupuncture began, provider explained to the patient to communicate at any time if the procedure was causing discomfort past their tolerance level. Patient agreed to advise acupuncturist. The acupuncturist counseled the patient on the risks of acupuncture treatment including pain, infection, bleeding, and no relief of pain. The patient was positioned comfortably. There was no evidence of infection at the site of needle insertions.    Objective   Physical Exam              Acupuncture Treatment  Body Points - Left: EXB12, GB 30, 34, 39, 40,  UB 27, 29, 30,31  Body Points - Bilateral: Kd 3, UB 13,  15, 18, 19, 23, 25,  60, Lv 8-Tan  Other Techniques Utilized: TDP Lamp  TDP Lamp Descripton: leo men with 2 moxa  Needle Count In: 27  Needle Count Out: 27  Total Face to Face Time (min): 25 minutes              Assessment/Plan

## 2024-12-09 DIAGNOSIS — M25.562 CHRONIC PAIN OF LEFT KNEE: ICD-10-CM

## 2024-12-09 DIAGNOSIS — M54.42 ACUTE LEFT-SIDED LOW BACK PAIN WITH LEFT-SIDED SCIATICA: ICD-10-CM

## 2024-12-09 DIAGNOSIS — G89.29 CHRONIC PAIN OF LEFT KNEE: ICD-10-CM

## 2024-12-09 DIAGNOSIS — G25.81 RESTLESS LEGS SYNDROME: ICD-10-CM

## 2024-12-09 RX ORDER — ROPINIROLE 1 MG/1
1 TABLET, FILM COATED ORAL 3 TIMES DAILY
Qty: 270 TABLET | Refills: 3 | Status: SHIPPED | OUTPATIENT
Start: 2024-12-09 | End: 2025-12-04

## 2024-12-09 RX ORDER — LIDOCAINE 50 MG/G
OINTMENT TOPICAL AS NEEDED
Qty: 50 G | Refills: 3 | Status: SHIPPED | OUTPATIENT
Start: 2024-12-09 | End: 2025-12-09

## 2024-12-09 NOTE — TELEPHONE ENCOUNTER
Elin Randhawa [address] Clinical Support Staff  Phone Number: [phone]     Good morning, I would like to request refill for Ropinirole 1mg, 90 days supply and Lidocaine 5% ointment.    Thank  you and have a good day.    Elin Randhawa

## 2024-12-12 ENCOUNTER — APPOINTMENT (OUTPATIENT)
Dept: INTEGRATIVE MEDICINE | Facility: CLINIC | Age: 69
End: 2024-12-12
Payer: MEDICARE

## 2024-12-12 DIAGNOSIS — M54.59 OTHER LOW BACK PAIN: Primary | ICD-10-CM

## 2024-12-12 PROCEDURE — 97810 ACUP 1/> WO ESTIM 1ST 15 MIN: CPT | Performed by: ACUPUNCTURIST

## 2024-12-12 PROCEDURE — 97811 ACUP 1/> W/O ESTIM EA ADD 15: CPT | Performed by: ACUPUNCTURIST

## 2024-12-12 NOTE — PROGRESS NOTES
"Acupuncture Visit:     Subjective   Patient ID: Elin Randhawa is a 69 y.o. female who presents for No chief complaint on file.  7/12    States LEFT buttocks/hip improved  LEFT knee and shoulder pn no change    Prev  States she has been good other than last night.  Notes she put salon paws on and a heating pad    prev  States pain has been stable, other than yesterday due to a lot of gardening.  L sided upper buttocks most pain  Notes sleep off due to worry about upcoming travel  Notes she takes Rx med for mid anxiety  Wakes up at night to urinate  States LEFT shoulder pain    prev  States Less LEFT sided pain in low back  Notes sleep improved    prev  Pt was gardening yesterday and feel stiff and sore.   She continues to have left sided knee pain with limited ROM in flexion.  She continues to have left sided buttock pain.    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  Movements help her feel loose.  Previous:  No change in pain  Could not get an appt after 1st tx  Pain when getting up after gardening.  Notes when she took a walk, less than a mile the pain started  Radiates down L upper buttocks to outer hip and outer leg (GB)  LEFT shoulder with pain at night when rolls over. Sleeps on sides, occ on back.     Initial  Notes L sided LBP x 5 yrs, worse in June 2024.  States she was travelling to [place] in April/May combined with gardening pain got worse.  Notes she had Rx oral steroid at the time some relief  Pain is constant, feels sore  Worse at night, sitting too long then getting up   Better walking, stretching, ibuprofen/tylenol  when gets bad 2x/wk  L knee pain outer knee, sometime medial - had a cortisone shot in the summer  Hears a \"pop\"   Denies surgery, no injury,  Pain level 4/10-worst increase to 7-8/10 this is when she takes OTC pn meds     LEFT top shoulder pain (front) and neck pain started last year  She is not sure what triggers the pain     Neck pain feels sore   Feels a " "\"pop\" when extends neck.             Session Information  This is visit number: 7  The patient has a total number of visits of: 12  Initial Acupuncture Treatment date: 10/02/24  Name of Insurance Company: [place] Med Adv 12+ 8 ADD : VPCY         Review of Systems         Provider reviewed plan for the acupuncture session, precautions and contraindications. Patient/guardian/hospital staff has given consent to treat with full understanding of what to expect during the session. Before acupuncture began, provider explained to the patient to communicate at any time if the procedure was causing discomfort past their tolerance level. Patient agreed to advise acupuncturist. The acupuncturist counseled the patient on the risks of acupuncture treatment including pain, infection, bleeding, and no relief of pain. The patient was positioned comfortably. There was no evidence of infection at the site of needle insertions.    Objective   Physical Exam              Acupuncture Treatment  Body Points - Left: UB, 27, 28, 53, 54, Lv 8, Kd 10, GB 34, UB 40, SJ 14, 15  Body Points - Bilateral: Kd 3, UB 13, 19-23, 44.06  TDP Lamp Descripton: leo men with 2 moxa  Needle Count In: 26  Needle Count Out: 26  Total Face to Face Time (min): 25 minutes              Assessment/Plan       "

## 2024-12-30 ENCOUNTER — APPOINTMENT (OUTPATIENT)
Dept: INTEGRATIVE MEDICINE | Facility: CLINIC | Age: 69
End: 2024-12-30
Payer: MEDICARE

## 2025-01-06 ENCOUNTER — APPOINTMENT (OUTPATIENT)
Dept: INTEGRATIVE MEDICINE | Facility: CLINIC | Age: 70
End: 2025-01-06
Payer: MEDICARE

## 2025-01-13 ENCOUNTER — APPOINTMENT (OUTPATIENT)
Dept: PRIMARY CARE | Facility: CLINIC | Age: 70
End: 2025-01-13
Payer: MEDICARE

## 2025-01-21 ENCOUNTER — HOSPITAL ENCOUNTER (OUTPATIENT)
Dept: RADIOLOGY | Facility: HOSPITAL | Age: 70
Discharge: HOME | End: 2025-01-21
Payer: MEDICARE

## 2025-01-21 ENCOUNTER — APPOINTMENT (OUTPATIENT)
Dept: PRIMARY CARE | Facility: CLINIC | Age: 70
End: 2025-01-21
Payer: MEDICARE

## 2025-01-21 VITALS
HEIGHT: 59 IN | SYSTOLIC BLOOD PRESSURE: 148 MMHG | DIASTOLIC BLOOD PRESSURE: 88 MMHG | HEART RATE: 78 BPM | WEIGHT: 110 LBS | BODY MASS INDEX: 22.18 KG/M2 | OXYGEN SATURATION: 97 %

## 2025-01-21 DIAGNOSIS — Z00.00 ROUTINE GENERAL MEDICAL EXAMINATION AT HEALTH CARE FACILITY: ICD-10-CM

## 2025-01-21 DIAGNOSIS — N89.8 VAGINAL ITCHING: ICD-10-CM

## 2025-01-21 DIAGNOSIS — Z13.29 SCREENING FOR THYROID DISORDER: ICD-10-CM

## 2025-01-21 DIAGNOSIS — Z00.00 MEDICARE ANNUAL WELLNESS VISIT, SUBSEQUENT: ICD-10-CM

## 2025-01-21 DIAGNOSIS — Z51.81 THERAPEUTIC DRUG MONITORING: Primary | ICD-10-CM

## 2025-01-21 DIAGNOSIS — F41.9 MILD ANXIETY: ICD-10-CM

## 2025-01-21 DIAGNOSIS — E55.9 VITAMIN D DEFICIENCY: ICD-10-CM

## 2025-01-21 DIAGNOSIS — Z12.31 ENCOUNTER FOR SCREENING MAMMOGRAM FOR MALIGNANT NEOPLASM OF BREAST: ICD-10-CM

## 2025-01-21 DIAGNOSIS — M25.512 ACUTE PAIN OF LEFT SHOULDER: ICD-10-CM

## 2025-01-21 DIAGNOSIS — M54.16 LUMBAR RADICULAR PAIN: ICD-10-CM

## 2025-01-21 DIAGNOSIS — G89.29 CHRONIC PAIN OF LEFT KNEE: ICD-10-CM

## 2025-01-21 DIAGNOSIS — M25.562 CHRONIC PAIN OF LEFT KNEE: ICD-10-CM

## 2025-01-21 DIAGNOSIS — G25.81 RESTLESS LEGS SYNDROME: ICD-10-CM

## 2025-01-21 DIAGNOSIS — G47.00 INSOMNIA, UNSPECIFIED TYPE: ICD-10-CM

## 2025-01-21 PROCEDURE — 73560 X-RAY EXAM OF KNEE 1 OR 2: CPT | Mod: LT

## 2025-01-21 PROCEDURE — 80307 DRUG TEST PRSMV CHEM ANLYZR: CPT

## 2025-01-21 PROCEDURE — 73560 X-RAY EXAM OF KNEE 1 OR 2: CPT | Mod: LEFT SIDE | Performed by: RADIOLOGY

## 2025-01-21 PROCEDURE — 73030 X-RAY EXAM OF SHOULDER: CPT | Mod: LEFT SIDE | Performed by: RADIOLOGY

## 2025-01-21 PROCEDURE — 1036F TOBACCO NON-USER: CPT

## 2025-01-21 PROCEDURE — G0439 PPPS, SUBSEQ VISIT: HCPCS

## 2025-01-21 PROCEDURE — 1123F ACP DISCUSS/DSCN MKR DOCD: CPT

## 2025-01-21 PROCEDURE — 99213 OFFICE O/P EST LOW 20 MIN: CPT

## 2025-01-21 PROCEDURE — 3008F BODY MASS INDEX DOCD: CPT

## 2025-01-21 PROCEDURE — 1159F MED LIST DOCD IN RCRD: CPT

## 2025-01-21 PROCEDURE — 73030 X-RAY EXAM OF SHOULDER: CPT | Mod: LT

## 2025-01-21 PROCEDURE — 1170F FXNL STATUS ASSESSED: CPT

## 2025-01-21 RX ORDER — CLONAZEPAM 1 MG/1
1 TABLET ORAL 2 TIMES DAILY
Qty: 180 TABLET | Refills: 1 | Status: SHIPPED | OUTPATIENT
Start: 2025-01-24 | End: 2025-07-23

## 2025-01-21 RX ORDER — CLONAZEPAM 1 MG/1
1 TABLET ORAL 2 TIMES DAILY
Qty: 14 TABLET | Refills: 0 | Status: SHIPPED | OUTPATIENT
Start: 2025-01-21 | End: 2025-01-28

## 2025-01-21 RX ORDER — ASPIRIN 325 MG
1 TABLET, DELAYED RELEASE (ENTERIC COATED) ORAL EVERY EVENING
Qty: 45 G | Refills: 0 | Status: SHIPPED | OUTPATIENT
Start: 2025-01-21 | End: 2025-01-28

## 2025-01-21 ASSESSMENT — ACTIVITIES OF DAILY LIVING (ADL)
BATHING: INDEPENDENT
DRESSING: INDEPENDENT
GROCERY_SHOPPING: INDEPENDENT
DOING_HOUSEWORK: INDEPENDENT
MANAGING_FINANCES: INDEPENDENT
TAKING_MEDICATION: INDEPENDENT

## 2025-01-21 ASSESSMENT — ANXIETY QUESTIONNAIRES
3. WORRYING TOO MUCH ABOUT DIFFERENT THINGS: NOT AT ALL
IF YOU CHECKED OFF ANY PROBLEMS ON THIS QUESTIONNAIRE, HOW DIFFICULT HAVE THESE PROBLEMS MADE IT FOR YOU TO DO YOUR WORK, TAKE CARE OF THINGS AT HOME, OR GET ALONG WITH OTHER PEOPLE: NOT DIFFICULT AT ALL
2. NOT BEING ABLE TO STOP OR CONTROL WORRYING: NOT AT ALL
6. BECOMING EASILY ANNOYED OR IRRITABLE: NOT AT ALL
1. FEELING NERVOUS, ANXIOUS, OR ON EDGE: NOT AT ALL
5. BEING SO RESTLESS THAT IT IS HARD TO SIT STILL: SEVERAL DAYS
7. FEELING AFRAID AS IF SOMETHING AWFUL MIGHT HAPPEN: NOT AT ALL

## 2025-01-21 ASSESSMENT — ENCOUNTER SYMPTOMS
CARDIOVASCULAR NEGATIVE: 1
PSYCHIATRIC NEGATIVE: 1
CONSTITUTIONAL NEGATIVE: 1
ENDOCRINE NEGATIVE: 1
LOSS OF SENSATION IN FEET: 0
OCCASIONAL FEELINGS OF UNSTEADINESS: 0
BACK PAIN: 1
NEUROLOGICAL NEGATIVE: 1
GASTROINTESTINAL NEGATIVE: 1
ALLERGIC/IMMUNOLOGIC NEGATIVE: 1
EYES NEGATIVE: 1
ARTHRALGIAS: 1
HEMATOLOGIC/LYMPHATIC NEGATIVE: 1
RESPIRATORY NEGATIVE: 1
DEPRESSION: 0

## 2025-01-21 NOTE — ASSESSMENT & PLAN NOTE
Colonoscopy due 2026  Mammogram ordered today.   Lab work ordered today  Orders:    CBC and Auto Differential; Future    Lipid Panel; Future    Basic Metabolic Panel; Future    Vitamin D 25-Hydroxy,Total (for eval of Vitamin D levels); Future    TSH with reflex to Free T4 if abnormal; Future

## 2025-01-21 NOTE — ASSESSMENT & PLAN NOTE
takes clonazepam daily for sleep and anxiety and is working well for her. CSA and UDS updated today.   Orders:    clonazePAM (KlonoPIN) 1 mg tablet; Take 1 tablet (1 mg) by mouth 2 times a day for 7 days.

## 2025-01-21 NOTE — ASSESSMENT & PLAN NOTE
takes clonazepam daily for sleep and anxiety and is working well for her. CSA and UDS updated today.   Orders:    clonazePAM (KlonoPIN) 1 mg tablet; Take 1 tablet (1 mg) by mouth 2 times a day. Do not fill before January 24, 2025.

## 2025-01-21 NOTE — PROGRESS NOTES
Subjective   Reason for Visit: Elin Randhawa is an 69 y.o. female here for a Medicare Wellness visit.     Past Medical, Surgical, and Family History reviewed and updated in chart.    Reviewed all medications by prescribing practitioner or clinical pharmacist (such as prescriptions, OTCs, herbal therapies and supplements) and documented in the medical record.  OARRS:  Lizzie Brand, APRN-CNP on 2025  8:23 AM  I have personally reviewed the OARRS report for Elin Randhawa. I have considered the risks of abuse, dependence, addiction and diversion    Is the patient prescribed a combination of a benzodiazepine and opioid?  No    Last Urine Drug Screen / ordered today: Yes  No results found for this or any previous visit (from the past 8760 hours).  Drug screen completed today        Controlled Substance Agreement:  Date of the Last Agreement: 24  Reviewed Controlled Substance Agreement including but not limited to the benefits, risks, and alternatives to treatment with a Controlled Substance medication(s).    Benzodiazepines:  What is the patient's goal of therapy? anxiety  Is this being achieved with current treatment? yes    KARINA-7:  Over the last 2 weeks, how often have you been bothered by any of the following problems?  Feeling nervous, anxious, or on edge: 0  Not being able to stop or control worryin  Worrying too much about different things: 0  Being so restless that it is hard to sit still: 1  Becoming easily annoyed or irritable: 0  Feeling afraid as if something awful might happen: 0        Activities of Daily Living:   Is your overall impression that this patient is benefiting (symptom reduction outweighs side effects) from benzodiazepine therapy? Yes     1. Physical Functioning: Better  2. Family Relationship: Better  3. Social Relationship: Better  4. Mood: Better  5. Sleep Patterns: Better  6. Overall Function: Better  HPI  69 yof in office for medicare wellness.  PMH of anxiety,  "insomnia and lumbar pain.   Due for lab work.     Left knee pain when going sitting to standing, is limping for a few steps. Had knee injection to left knee and stated it helped for a very short time.   Remains to have low back pain, is not interested in injections in back. Had MR of lumbar spine,   Was going acupuncture but insurance changed so had to stop going.  Remains to do stretches and exercise for lowe back at home, has the print out of the exercise and does them almost daily. Helps relieve the pain, but remains to still have it.   Left shoulder/bicep pain, has ROM and strength remains intake. 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 Pain starts in shoulder and radiates to biceps. Endorses does good stretching prior to work outs and after work outs.   Takes naproxen.  Uses lidocaine get topically    Patient Care Team:  KIRILL Burciaga as PCP - General (Family Medicine)  KIRILL Lanier as PCP - [employer] PCP     Review of Systems   Constitutional: Negative.    HENT: Negative.     Eyes: Negative.    Respiratory: Negative.     Cardiovascular: Negative.    Gastrointestinal: Negative.    Endocrine: Negative.    Genitourinary: Negative.    Musculoskeletal:  Positive for arthralgias and back pain.   Skin: Negative.    Allergic/Immunologic: Negative.    Neurological: Negative.    Hematological: Negative.    Psychiatric/Behavioral: Negative.     All other systems reviewed and are negative.      Objective   Vitals:  /88   Pulse 78   Ht 1.499 m (4' 11.02\")   Wt 49.9 kg (110 lb)   SpO2 97%   BMI 22.21 kg/m²       Physical Exam  Constitutional:       Appearance: Normal appearance.   HENT:      Head: Normocephalic and atraumatic.      Nose: Nose normal.      Mouth/Throat:      Mouth: Mucous membranes are moist.      Pharynx: Oropharynx is clear.   Eyes:      Pupils: Pupils are equal, round, and reactive to " light.   Cardiovascular:      Rate and Rhythm: Normal rate and regular rhythm.      Pulses: Normal pulses.      Heart sounds: Normal heart sounds.   Pulmonary:      Effort: Pulmonary effort is normal.      Breath sounds: Normal breath sounds.   Abdominal:      General: Bowel sounds are normal.      Palpations: Abdomen is soft.   Musculoskeletal:         General: Normal range of motion.      Cervical back: Normal range of motion.   Skin:     General: Skin is warm and dry.   Neurological:      General: No focal deficit present.      Mental Status: She is alert and oriented to person, place, and time.   Psychiatric:         Mood and Affect: Mood normal.         Behavior: Behavior normal.         Thought Content: Thought content normal.         Judgment: Judgment normal.         Assessment & Plan  Therapeutic drug monitoring  Drug screening for klonopin.   Orders:    Drug Screen, Urine With Reflex to Confirmation    Mild anxiety  takes clonazepam daily for sleep and anxiety and is working well for her. CSA and UDS updated today.   Orders:    clonazePAM (KlonoPIN) 1 mg tablet; Take 1 tablet (1 mg) by mouth 2 times a day. Do not fill before January 24, 2025.    Routine general medical examination at health care facility    Orders:    1 Year Follow Up In [facility] - PCP - Wellness Exam; Future    Insomnia, unspecified type  takes clonazepam daily for sleep and anxiety and is working well for her. CSA and UDS updated today.   Orders:    clonazePAM (KlonoPIN) 1 mg tablet; Take 1 tablet (1 mg) by mouth 2 times a day for 7 days.    Medicare annual wellness visit, subsequent  Colonoscopy due 2026  Mammogram ordered today.   Lab work ordered today  Orders:    CBC and Auto Differential; Future    Lipid Panel; Future    Basic Metabolic Panel; Future    Vitamin D 25-Hydroxy,Total (for eval of Vitamin D levels); Future    TSH with reflex to Free T4 if abnormal; Future    Encounter for screening mammogram for malignant  neoplasm of breast    Orders:    BI mammo bilateral screening tomosynthesis; Future    Chronic pain of left knee  Had knee injection at last visit with previous PCP. Only helped for short time. Has not had previous imaging of knee. Will get imaging and refer to ortho.   Orders:    Referral to Orthopaedic Surgery; Future    XR knee left 1-2 views; Future    Lumbar radicular pain  Has MR 7/17/24: Multilevel degenerative changes of the lumbar spine primarily from  L3-4 through L5-S1 are very similar from the previous examination.  Is a , did not do physical therapy. States in the past followed pain management for injections and it did not help. Has been following with massage therapy monthly and acupuncture that has helped some. Does not want to follow with pain management, does not want injections. Will refer to ortho for treatment options.   Orders:    Referral to Orthopaedic Surgery; Future    Referral to [facility]; Future    Referral to [facility]; Future    Vitamin D deficiency    Orders:    Vitamin D 25-Hydroxy,Total (for eval of Vitamin D levels); Future    Screening for thyroid disorder    Orders:    TSH with reflex to Free T4 if abnormal; Future    Acute pain of left shoulder  Left shoulder/bicep pain, has ROM and strength remains intake. 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 Pain starts in shoulder and radiates to biceps. Endorses does good stretching prior to work outs and after work outs.   Takes naproxen.  Uses lidocaine get topically  Will order xray.   Orders:    XR shoulder left 2+ views; Future    Vaginal itching  Has been using over the counter monostat without effectiveness. Denies discharge, foul odor.   Orders:    clotrimazole (Lotrimin) 1 % vaginal cream; Insert 1 applicator into the vagina once daily in the evening for 7 days.    BMI 22.0-22.9, adult    Orders:    CBC and Auto Differential;  Future    Restless legs syndrome  Assessed and stable with ropinirole.               Advance Directives Discussion  16 - 20 minutes were spent discussing Advanced Care Planning (including a Living Will, Medical Power Of , as well as specific end of life choices and/or directives). The details of that discussion were documented in Advanced Directives Discussion section of the medical record.     Cardiac Risk Assessment  15 - 20 minutes were spent discussing Cardiovascular risk and, if needed, lifestyle modifications were recommended, including nutritional choices, exercise, and elimination of habits contributing to risk.   Aspirin use/disuse was discussed following the guidelines below:  low dose ASA ( mg) should be considered:    If prior Heart Attack/Stroke/Peripheral vascular disease:  Generally recommend daily low dose aspirin unless extremely high bleeding risk (e.g., gastrointestinal).    If no prior Heart Attack/Stroke/Peripheral vascular disease:              Age over 70: Do not use Aspirin for prevention    Age less than 70 and 10-year cardiovascular disease risk is >20%: use low dose Aspirin for prevention.

## 2025-01-21 NOTE — PATIENT INSTRUCTIONS
I am ordering labs for you to get when you are fasting (meaning nothing to eat or drink for at least 12 hours before, water and black coffee are okay). Once we get the results, someone from the office will call you. If you do not hear from someone within one week of having your blood drawn, please call us [phone] so that we can go over the results.    It is recommend that you consume a balanced diet to include: fruits, a variety of vegetables (dark green, red and orange, legumes like beans and peas, starch, other), grains (at least half of which are whole grains), fat-free or low-fat dairy including milk,cheese, or fortified soy beverages, and proteins such as lean means, poultry, fish, eggs, nuts, seeds.   Limit processed foods, saturated and trans fats, added sugars and sodium (salt).     It is recommended that you get at least 150min exercise every week. More is even better. Moderate activities are activities that get your heart beating faster but you are still able to carry on a conversation. Vigorous activities will have you take breaths after a few words. You should also include two days of muscle strengthening activities to include all major muscle groups (arms, shoulders, chest, abdomen, back, hips and legs)    Benefits of keeping active include:  Lowering your risk of developing type II diabetes and some cancers  Control your blood pressure  Maintain a healthy weight  Improving mood and managing stress  Improving sleep

## 2025-01-21 NOTE — ASSESSMENT & PLAN NOTE
Has MR 7/17/24: Multilevel degenerative changes of the lumbar spine primarily from  L3-4 through L5-S1 are very similar from the previous examination.  Is a , did not do physical therapy. States in the past followed pain management for injections and it did not help. Has been following with massage therapy monthly and acupuncture that has helped some. Does not want to follow with pain management, does not want injections. Will refer to ortho for treatment options.   Orders:    Referral to Orthopaedic Surgery; Future    Referral to [facility]; Future    Referral to [facility]; Future

## 2025-01-22 LAB
AMPHETAMINES UR QL SCN: NORMAL
BARBITURATES UR QL SCN: NORMAL
BENZODIAZ UR QL SCN: NORMAL
BZE UR QL SCN: NORMAL
CANNABINOIDS UR QL SCN: NORMAL
FENTANYL+NORFENTANYL UR QL SCN: NORMAL
METHADONE UR QL SCN: NORMAL
OPIATES UR QL SCN: NORMAL
OXYCODONE+OXYMORPHONE UR QL SCN: NORMAL
PCP UR QL SCN: NORMAL

## 2025-01-24 ENCOUNTER — TELEPHONE (OUTPATIENT)
Dept: PRIMARY CARE | Facility: CLINIC | Age: 70
End: 2025-01-24
Payer: MEDICARE

## 2025-01-24 NOTE — RESULT ENCOUNTER NOTE
Xray to knee and shoulder shows mild osteoarthritis, we can send to ortho to see if they have any options for you to help with the pain.

## 2025-01-24 NOTE — TELEPHONE ENCOUNTER
1st attempt  Lmovm informing patient to give the office a call back to retrieve providers message

## 2025-01-24 NOTE — TELEPHONE ENCOUNTER
----- Message from Lizzie Brand sent at 1/24/2025  1:59 PM EST -----  Xray to knee and shoulder shows mild osteoarthritis, we can send to ortho to see if they have any options for you to help with the pain.

## 2025-02-03 ENCOUNTER — APPOINTMENT (OUTPATIENT)
Dept: INTEGRATIVE MEDICINE | Facility: CLINIC | Age: 70
End: 2025-02-03
Payer: MEDICARE

## 2025-02-05 ENCOUNTER — OFFICE VISIT (OUTPATIENT)
Dept: ORTHOPEDIC SURGERY | Facility: HOSPITAL | Age: 70
End: 2025-02-05
Payer: MEDICARE

## 2025-02-05 ENCOUNTER — HOSPITAL ENCOUNTER (OUTPATIENT)
Dept: RADIOLOGY | Facility: HOSPITAL | Age: 70
Discharge: HOME | End: 2025-02-05
Payer: MEDICARE

## 2025-02-05 ENCOUNTER — TELEPHONE (OUTPATIENT)
Dept: PRIMARY CARE | Facility: CLINIC | Age: 70
End: 2025-02-05

## 2025-02-05 ENCOUNTER — APPOINTMENT (OUTPATIENT)
Dept: PRIMARY CARE | Facility: CLINIC | Age: 70
End: 2025-02-05
Payer: MEDICARE

## 2025-02-05 VITALS — BODY MASS INDEX: 22.18 KG/M2 | HEIGHT: 59 IN | WEIGHT: 110 LBS

## 2025-02-05 DIAGNOSIS — M25.562 CHRONIC PAIN OF LEFT KNEE: ICD-10-CM

## 2025-02-05 DIAGNOSIS — M17.12 OSTEOARTHRITIS OF LEFT PATELLOFEMORAL JOINT: ICD-10-CM

## 2025-02-05 DIAGNOSIS — M17.12 PRIMARY OSTEOARTHRITIS OF LEFT KNEE: Primary | ICD-10-CM

## 2025-02-05 DIAGNOSIS — G89.29 CHRONIC PAIN OF LEFT KNEE: ICD-10-CM

## 2025-02-05 DIAGNOSIS — M54.16 LUMBAR RADICULAR PAIN: ICD-10-CM

## 2025-02-05 DIAGNOSIS — Z12.31 ENCOUNTER FOR SCREENING MAMMOGRAM FOR MALIGNANT NEOPLASM OF BREAST: ICD-10-CM

## 2025-02-05 PROCEDURE — 99204 OFFICE O/P NEW MOD 45 MIN: CPT | Performed by: SPECIALIST

## 2025-02-05 PROCEDURE — 20610 DRAIN/INJ JOINT/BURSA W/O US: CPT | Mod: LT | Performed by: SPECIALIST

## 2025-02-05 PROCEDURE — 1123F ACP DISCUSS/DSCN MKR DOCD: CPT | Performed by: SPECIALIST

## 2025-02-05 PROCEDURE — 3008F BODY MASS INDEX DOCD: CPT | Performed by: SPECIALIST

## 2025-02-05 PROCEDURE — 99214 OFFICE O/P EST MOD 30 MIN: CPT | Mod: 25 | Performed by: SPECIALIST

## 2025-02-05 PROCEDURE — 77067 SCR MAMMO BI INCL CAD: CPT | Performed by: RADIOLOGY

## 2025-02-05 PROCEDURE — 2500000004 HC RX 250 GENERAL PHARMACY W/ HCPCS (ALT 636 FOR OP/ED): Performed by: SPECIALIST

## 2025-02-05 PROCEDURE — 1159F MED LIST DOCD IN RCRD: CPT | Performed by: SPECIALIST

## 2025-02-05 PROCEDURE — 77067 SCR MAMMO BI INCL CAD: CPT

## 2025-02-05 PROCEDURE — 1036F TOBACCO NON-USER: CPT | Performed by: SPECIALIST

## 2025-02-05 PROCEDURE — 77063 BREAST TOMOSYNTHESIS BI: CPT | Performed by: RADIOLOGY

## 2025-02-05 RX ORDER — LIDOCAINE HYDROCHLORIDE 10 MG/ML
1 INJECTION, SOLUTION INFILTRATION; PERINEURAL
Status: COMPLETED | OUTPATIENT
Start: 2025-02-05 | End: 2025-02-05

## 2025-02-05 RX ORDER — TRIAMCINOLONE ACETONIDE 40 MG/ML
40 INJECTION, SUSPENSION INTRA-ARTICULAR; INTRAMUSCULAR
Status: COMPLETED | OUTPATIENT
Start: 2025-02-05 | End: 2025-02-05

## 2025-02-05 RX ADMIN — LIDOCAINE HYDROCHLORIDE 1 ML: 10 INJECTION, SOLUTION INFILTRATION; PERINEURAL at 16:31

## 2025-02-05 RX ADMIN — TRIAMCINOLONE ACETONIDE 40 MG: 40 INJECTION, SUSPENSION INTRA-ARTICULAR; INTRAMUSCULAR at 16:31

## 2025-02-05 ASSESSMENT — PAIN SCALES - GENERAL: PAINLEVEL_OUTOF10: 5 - MODERATE PAIN

## 2025-02-05 ASSESSMENT — PAIN - FUNCTIONAL ASSESSMENT: PAIN_FUNCTIONAL_ASSESSMENT: 0-10

## 2025-02-05 NOTE — TELEPHONE ENCOUNTER
----- Message from Lizzie Brand sent at 2/5/2025  4:44 PM EST -----  Mammogram normal, recheck yearly as recommended.

## 2025-02-05 NOTE — PROGRESS NOTES
NPV/Ref by Dr Brand for chronic left knee pain.  She has had pain off and on for about 5 years, but getting worse lately.  Her pain is on the lateral side and sometimes in the back of her knee.  Hurts to get up from sitting.   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   No prior surgery.  She did try a cortisone shot in June with relief for a few days only.   She did physical therapy a few years ago and now continues to do exercises at home.  XR done 01/21/2025    HPI as noted above  EXAM:    GENERAL: A/Ox3, NAD. Appears healthy, well nourished  SKIN: no erythema, rashes, or ecchymoses     MUSCULOSKELETAL:  Laterality: Left knee Exam  - Alignment: partially correctible varus deformity  - ROM: Full with mild patellofemoral crepitus  - Effusion: none  - Strength: knee extension and flexion 5/5, EHL/PF/DF motor intact  - Palpation: TTP mostly along medial joint line  - Stability: Anterior/Posterior stable, varus/valgus stable. Mild pseudo valgus instability  - Gait: mild antalgic  - Hip Exam: flexion to 100+ degrees, full extension, internal/external rotation adequate, and no pain with log roll  - Special Tests: none performed    NEUROVASCULAR:  - Neurovascular Status: sensation intact to light touch distally  - Capillary refill brisk at extremities, Bilateral dorsalis pedis pulse 2+    RADIOGRAPHS moderate osteoarthritis left knee mostly patellofemoral    ASSESSMENT left knee osteoarthritis/patellofemoral.    PLAN she tolerated the left knee Kenalog injection today will continue her home PT directive exercise.  I think she would be an excellent candidate for hyaluronic acid injection in the future.  She would call to establish.  L Inj/Asp: L knee on 2/5/2025 4:31 PM  Indications: pain  Details: 22 G needle, anterolateral approach  Medications: 1 mL lidocaine 10 mg/mL (1 %); 40 mg triamcinolone acetonide 40 mg/mL  Outcome: tolerated well, no immediate complications  Procedure,  treatment alternatives, risks and benefits explained, specific risks discussed. Consent was given by the patient. Immediately prior to procedure a time out was called to verify the correct patient, procedure, equipment, support staff and site/side marked as required. Patient was prepped and draped in the usual sterile fashion.           This note was dictated using speech recognition software and was not corrected for spelling or grammatical errors

## 2025-02-11 LAB
25(OH)D3+25(OH)D2 SERPL-MCNC: 56 NG/ML (ref 30–100)
ANION GAP SERPL CALCULATED.4IONS-SCNC: 9 MMOL/L (CALC) (ref 7–17)
BASOPHILS # BLD AUTO: 47 CELLS/UL (ref 0–200)
BASOPHILS NFR BLD AUTO: 0.6 %
BUN SERPL-MCNC: 26 MG/DL (ref 7–25)
BUN/CREAT SERPL: 33 (CALC) (ref 6–22)
CALCIUM SERPL-MCNC: 9.2 MG/DL (ref 8.6–10.4)
CHLORIDE SERPL-SCNC: 103 MMOL/L (ref 98–110)
CHOLEST SERPL-MCNC: 203 MG/DL
CHOLEST/HDLC SERPL: 2.4 (CALC)
CO2 SERPL-SCNC: 27 MMOL/L (ref 20–32)
CREAT SERPL-MCNC: 0.8 MG/DL (ref 0.5–1.05)
EGFRCR SERPLBLD CKD-EPI 2021: 81 ML/MIN/1.73M2
EOSINOPHIL # BLD AUTO: 62 CELLS/UL (ref 15–500)
EOSINOPHIL NFR BLD AUTO: 0.8 %
ERYTHROCYTE [DISTWIDTH] IN BLOOD BY AUTOMATED COUNT: 12 % (ref 11–15)
GLUCOSE SERPL-MCNC: 96 MG/DL (ref 65–99)
HCT VFR BLD AUTO: 46.4 % (ref 35–45)
HDLC SERPL-MCNC: 84 MG/DL
HGB BLD-MCNC: 14.6 G/DL (ref 11.7–15.5)
LDLC SERPL CALC-MCNC: 97 MG/DL (CALC)
LYMPHOCYTES # BLD AUTO: 2239 CELLS/UL (ref 850–3900)
LYMPHOCYTES NFR BLD AUTO: 28.7 %
MCH RBC QN AUTO: 28.9 PG (ref 27–33)
MCHC RBC AUTO-ENTMCNC: 31.5 G/DL (ref 32–36)
MCV RBC AUTO: 91.7 FL (ref 80–100)
MONOCYTES # BLD AUTO: 663 CELLS/UL (ref 200–950)
MONOCYTES NFR BLD AUTO: 8.5 %
NEUTROPHILS # BLD AUTO: 4789 CELLS/UL (ref 1500–7800)
NEUTROPHILS NFR BLD AUTO: 61.4 %
NONHDLC SERPL-MCNC: 119 MG/DL (CALC)
PLATELET # BLD AUTO: 405 THOUSAND/UL (ref 140–400)
PMV BLD REES-ECKER: 10 FL (ref 7.5–12.5)
POTASSIUM SERPL-SCNC: 4 MMOL/L (ref 3.5–5.3)
RBC # BLD AUTO: 5.06 MILLION/UL (ref 3.8–5.1)
SODIUM SERPL-SCNC: 139 MMOL/L (ref 135–146)
TRIGL SERPL-MCNC: 121 MG/DL
TSH SERPL-ACNC: 3.21 MIU/L (ref 0.4–4.5)
WBC # BLD AUTO: 7.8 THOUSAND/UL (ref 3.8–10.8)

## 2025-02-12 ENCOUNTER — TELEPHONE (OUTPATIENT)
Dept: PRIMARY CARE | Facility: CLINIC | Age: 70
End: 2025-02-12
Payer: MEDICARE

## 2025-02-24 ENCOUNTER — OFFICE VISIT (OUTPATIENT)
Dept: ORTHOPEDIC SURGERY | Facility: HOSPITAL | Age: 70
End: 2025-02-24
Payer: MEDICARE

## 2025-02-24 ENCOUNTER — EVALUATION (OUTPATIENT)
Dept: PHYSICAL THERAPY | Facility: HOSPITAL | Age: 70
End: 2025-02-24
Payer: MEDICARE

## 2025-02-24 VITALS — BODY MASS INDEX: 22.18 KG/M2 | HEIGHT: 59 IN | WEIGHT: 110 LBS

## 2025-02-24 DIAGNOSIS — M75.52 ACUTE BURSITIS OF LEFT SHOULDER: ICD-10-CM

## 2025-02-24 DIAGNOSIS — M75.82 ROTATOR CUFF TENDINITIS, LEFT: Primary | ICD-10-CM

## 2025-02-24 DIAGNOSIS — M75.52 BURSITIS OF LEFT SHOULDER: Primary | ICD-10-CM

## 2025-02-24 DIAGNOSIS — R29.898 HIP TIGHTNESS: ICD-10-CM

## 2025-02-24 DIAGNOSIS — M75.42 IMPINGEMENT SYNDROME OF LEFT SHOULDER: ICD-10-CM

## 2025-02-24 DIAGNOSIS — M17.12 OSTEOARTHRITIS OF LEFT PATELLOFEMORAL JOINT: ICD-10-CM

## 2025-02-24 PROCEDURE — 1123F ACP DISCUSS/DSCN MKR DOCD: CPT | Performed by: ORTHOPAEDIC SURGERY

## 2025-02-24 PROCEDURE — 3008F BODY MASS INDEX DOCD: CPT | Performed by: ORTHOPAEDIC SURGERY

## 2025-02-24 PROCEDURE — 99214 OFFICE O/P EST MOD 30 MIN: CPT | Performed by: ORTHOPAEDIC SURGERY

## 2025-02-24 PROCEDURE — 1160F RVW MEDS BY RX/DR IN RCRD: CPT | Performed by: ORTHOPAEDIC SURGERY

## 2025-02-24 PROCEDURE — 99214 OFFICE O/P EST MOD 30 MIN: CPT | Mod: 25 | Performed by: ORTHOPAEDIC SURGERY

## 2025-02-24 PROCEDURE — 1159F MED LIST DOCD IN RCRD: CPT | Performed by: ORTHOPAEDIC SURGERY

## 2025-02-24 PROCEDURE — 97161 PT EVAL LOW COMPLEX 20 MIN: CPT | Mod: GP | Performed by: PHYSICAL THERAPIST

## 2025-02-24 PROCEDURE — 2500000004 HC RX 250 GENERAL PHARMACY W/ HCPCS (ALT 636 FOR OP/ED): Performed by: ORTHOPAEDIC SURGERY

## 2025-02-24 PROCEDURE — 1036F TOBACCO NON-USER: CPT | Performed by: ORTHOPAEDIC SURGERY

## 2025-02-24 PROCEDURE — 20610 DRAIN/INJ JOINT/BURSA W/O US: CPT | Mod: LT | Performed by: ORTHOPAEDIC SURGERY

## 2025-02-24 PROCEDURE — 97110 THERAPEUTIC EXERCISES: CPT | Mod: GP | Performed by: PHYSICAL THERAPIST

## 2025-02-24 RX ORDER — MELOXICAM 15 MG/1
15 TABLET ORAL DAILY
Qty: 30 TABLET | Refills: 3 | Status: SHIPPED | OUTPATIENT
Start: 2025-02-24 | End: 2025-06-24

## 2025-02-24 RX ORDER — TRIAMCINOLONE ACETONIDE 40 MG/ML
40 INJECTION, SUSPENSION INTRA-ARTICULAR; INTRAMUSCULAR
Status: COMPLETED | OUTPATIENT
Start: 2025-02-24 | End: 2025-02-24

## 2025-02-24 RX ORDER — LIDOCAINE HYDROCHLORIDE 10 MG/ML
4 INJECTION, SOLUTION INFILTRATION; PERINEURAL
Status: COMPLETED | OUTPATIENT
Start: 2025-02-24 | End: 2025-02-24

## 2025-02-24 RX ADMIN — TRIAMCINOLONE ACETONIDE 40 MG: 40 INJECTION, SUSPENSION INTRA-ARTICULAR; INTRAMUSCULAR at 10:48

## 2025-02-24 RX ADMIN — LIDOCAINE HYDROCHLORIDE 4 ML: 10 INJECTION, SOLUTION INFILTRATION; PERINEURAL at 10:48

## 2025-02-24 ASSESSMENT — PAIN - FUNCTIONAL ASSESSMENT: PAIN_FUNCTIONAL_ASSESSMENT: 0-10

## 2025-02-24 ASSESSMENT — ENCOUNTER SYMPTOMS
OCCASIONAL FEELINGS OF UNSTEADINESS: 0
LOSS OF SENSATION IN FEET: 0
DEPRESSION: 0
LOSS OF SENSATION IN FEET: 0
DEPRESSION: 0
OCCASIONAL FEELINGS OF UNSTEADINESS: 0

## 2025-02-24 ASSESSMENT — PAIN SCALES - GENERAL: PAINLEVEL_OUTOF10: 0 - NO PAIN

## 2025-02-24 NOTE — PROGRESS NOTES
Physical Therapy    Physical Therapy Evaluation    Patient Name: Elin Randhawa  MRN: [id number]  Today's Date: 2/24/2025  Time Calculation  Start Time: 1110 (pt. late)  Stop Time: 1210  Time Calculation (min): 60 min    PT Evaluation Time Entry  PT Evaluation (Low) Time Entry: 50  PT Therapeutic Procedures Time Entry  Therapeutic Exercise Time Entry: 10                   Visit # 1  Assessment  PT Assessment Results: Decreased strength, Pain (decreased flexibility)  Rehab Prognosis: Good  Evaluation/Treatment Tolerance: Patient tolerated treatment well  Pt. Is a 68 y/o female with L knee OA PF joint, and L shld bursitis. Pt. Is a  for local gyms and she is always strengthening and stretching. Pt. Is with a good knowledge base of exercise and appropriate postures. Pt. Is with NAVNEET knee valgus and L knee cap with increased mobility with medial mobilization vs lateral. Pt. May benefit from taping, trialed Kinesiotaping today for facilitation and also decrease inflammation. L hip is with ITB tightness, and hip ER tightness. Pt. Is with L shld wnl ROM, but decreased strength and pain with MMT. TTT L upper trap and mid trap and pt. May benefit from modalities to decrease tightness and inflammation while increasing scapular strength to increase scapulohumeral rhythm and decrease pain in upper trap mm. Pt. Would benefit from skilled PT to address the above deficits.     Plan  Treatment/Interventions: Cryotherapy, Hot pack, Education/ Instruction, Therapeutic exercises, Therapeutic activities, Electrical stimulation, Dry needling, Manual therapy, Taping techniques, Ultrasound  PT Plan: Skilled PT  Rehab Potential: Good  Plan of Care Agreement: Patient  1x/wk x 2-4 weeks    Current Problem  1. Bursitis of left shoulder        2. Osteoarthritis of left patellofemoral joint  Referral to Physical Therapy    Follow Up In Physical Therapy      3. Hip tightness            Subjective   Pt. States she has L knee pain  since 2020. Pt. Does not recall a reason for onset of the pain.   Pt. States she does not have pain at this time, but she does have L knee pain when gardening in deep squat and standing, or after sitting for too long then standing, getting up in the middle of the night she has to hold the wall but by the time she makes it to the bathroom the pain is gone.     Worst pain 8/10 in above scenarios.    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     Pt. Also with shld pain L, she c/o upper shld between neck and shld area with pain with teaching and raising arm up with weights.     Pt. Has had xray and knee injection, and L shld injection.      General:  General  Reason for Referral: intitial eval  Referred By: Dr. Judith MD  Preferred Learning Style: verbal  Precautions:  Precautions  STEADI Fall Risk Score (The score of 4 or more indicates an increased risk of falling): 1  Medical Precautions:  (HTN, OA, restless leg)       Pain:  Pain Assessment: 0-10  0-10 (Numeric) Pain Score: 0 - No pain  Pain Type: Chronic pain  Pain Location: Knee  Pain Orientation: Left  Pain Radiating Towards: front of knee and down front / side  Home Living:  wnl   Prior Function Per Pt/Caregiver Report:   : silver sneakers, yoga, TRX     Objective   Posture:    NAVNEET Knee valgus    NAVNEET LE equal leg length     Range of Motion:    L knee wnl      Strength:  NAVNEET LE wnl    L shld :   Flex: pain 4/5  Scap : wnl           Palpation:  Knee cap mobility : hyper mobile medial glide L knee cap    TTT L shld: upper trap mm, mid trap mm.      Special Tests:  L shld:   Cortes justine: (-)  Neers: (+)  Empty can (-)     Gait:. Wfl indep        Other:  Visit 1: 2/24/2025   Eval , HEP, Ktape L knee quad, ritchie, later joint line.     EXERCISES       Date       VISIT# # # # #    REPS REPS REPS REPS          Assess K tape              McConnels tape L knee cap pulling lateral              IASTM    L upper trap mm.              L upper trap mm US              Prone scap squeezes       Low I's       Low Y's       T's              clamshells              Quad set with slr    Fwd    abd                                                                                                  HEP                 Outcome Measures:   LEFS 40    OP EDUCATION:  Access Code: [email or web address]  URL: [email or web address]  Date: 02/24/2025  Prepared by: Kayleigh Moya    Exercises  - Supine Piriformis Stretch with Leg Straight  - 1 x daily - 7 x weekly - 3 sets - 30 sec hold  - Supine Hip External Rotation Stretch  - 1 x daily - 7 x weekly - 3 sets - 30sec hold  - Gastroc Stretch on Step  - 1 x daily - 7 x weekly - 3 sets - 30sec hold  - Seated Upper Trapezius Stretch  - 1 x daily - 7 x weekly - 3 sets - 30sec hold  Outpatient Education  Individual(s) Educated: Patient  Education Provided: Home Exercise Program, POC  Risk and Benefits Discussed with Patient/Caregiver/Other: yes  Patient/Caregiver Demonstrated Understanding: yes  Plan of Care Discussed and Agreed Upon: yes  Patient Response to Education: Patient/Caregiver Verbalized Understanding of Information    Goals:  Active       L shld bursitis; L knee PF ; hip tightness       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        Start:  02/24/25    Expected End:  04/24/25            Pt. Will be indep with progressive HEP to cont. Progress made in PT.        Start:  02/24/25    Expected End:  05/24/25            Pt. 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        Start:  02/24/25    Expected End:  05/24/25            Pt. will increase L hip flexibility to wnl to decrease L knee pain with all exercise classes.        Start:  02/24/25    Expected End:  05/24/25

## 2025-02-24 NOTE — PROGRESS NOTES
69 y.o. female presents today for evaluation of left shoulder pain for the past 6 months or so without injury. The patient reports gradual onset of worsening pain, worse at night. Pain is controlled. Patient reports no numbness and tingling.  Reports no previous surgeries, injections, or trauma to the area.  Reports pain worse with use, better at rest.  Pain dull ache, sharp at times.  Has taken over-the-counter anti-inflammatories with some mild relief.  Works as a  at the [employer].  Worse with overhead and behind the back activities.    Review of Systems    Constitutional: see HPI, no fever, no chills, not feeling tired, no significant weight gain or weight loss.   Eyes: No vision changes  ENT: no nosebleeds.   Cardiovascular: no chest pain.   Respiratory: no shortness of breath and no cough.   Gastrointestinal: no abdominal pain, no nausea, no vomiting and no diarrhea.   Musculoskeletal: per HPI  Neurological: no headache, no gait disturbances  Psychiatric: no depression and no sleep disturbances.   Endocrine: no muscle weakness and no muscle cramps.   Hematologic/Lymphatic: no swollen glands and no tendency for easy bruising or excessive swelling.     Patient's past medical history, past surgical history, allergies, and medications have been reviewed unless otherwise noted in the chart.     Shoulder:  Inspection:  no evidence of infection, no erythema, no edema, no ecchymosis, Palpation:  compartments are soft  Skin:  intact, Vascular:  capillary refill <2 seconds distally, Sensation:  sensation intact to light touch distally; AROM- Abduction 90, elevation to 165 degrees, ER 90 degrees, IR 90 degrees and L3;  TTP at the biceps tendon,  TTP at AC joint; TTP on the lateral deltoid Special- painful Cortes test, Neer's Test, cross body test; painful Empty can test/Drop Arm test;  negative Yergason's/Speed's Test;   painful belly pressed and posterior liftoff     Constitutional   General appearance:  Alert and in no acute distress. Well developed, well nourished.    Eyes   External Eye, Conjunctiva and lids: Normal external exam - pupils were equal in size, round, reactive to light (PERRL) with normal accommodation and extraocular movements intact (EOMI).   Ears, Nose, Mouth, and Throat   Hearing: Normal.   Neck   Neck: No neck mass was observed. Supple.   Pulmonary   Respiratory effort: No respiratory distress.   Cardiovascular   Examination of extremities: No peripheral edema.   Psychiatric   Judgment and insight: Intact.   Orientation to person, place, and time: Alert and oriented x 3.       Mood and affect: Normal.      XR - no fractures, no dislocations left shoulder, mild glenohumeral arthritis and AC joint arthritis    X-rays were personally reviewed by me. Radiology reports were reviewed by me as well, if available at the time.      Left rotator cuff tendinitis, bursitis, impingement syndrome  Based on the history, physical exam and imaging studies above, the patient's presentation is consistent with the above diagnosis.  I had a long discussion with the patient regarding their presentation and the treatment options.  We discussed initial nonoperative versus operative management options as well as potential further diagnostic imaging.  We again discussed her treatment options going forward along with their associated risks and benefits. After thorough discussion, the patient has elected to proceed with conservative management. All questions were answered to the patients satisfaction who seems satisfied with the plan.  They will call the office with any questions/concerns.    Discussion I discussed the diagnosis and treatment options with the patient today along with their associated risks and benefits. After thorough discussion, the patient has elected to proceed with a corticosteroid injection with Kenalog and Xylocaine, which was performed in the office today under aseptic technique and the patient  tolerated the procedure well.          Ortho Injections:           Injection site left shoulder. Medication 4 cc 1% Xylocaine, 1 cc 40 mg Kenalog, Injection given under sterile conditions. No immediate complications noted. Post injection instructions given.  [facility]  Physical therapy evaluation and treatment  Follow-up 8 weeks as needed no x-ray    Patient ID: Elin Randhawa is a 69 y.o. female.    L Inj/Asp: L glenohumeral on 2/24/2025 10:48 AM  Indications: pain  Details: 22 G needle, posterior approach  Medications: 40 mg triamcinolone acetonide 40 mg/mL; 0.5 mL lidocaine 10 mg/mL (1 %)  Outcome: tolerated well, no immediate complications  Procedure, treatment alternatives, risks and benefits explained, specific risks discussed. Consent was given by the patient. Immediately prior to procedure a time out was called to verify the correct patient, procedure, equipment, support staff and site/side marked as required. Patient was prepped and draped in the usual sterile fashion.

## 2025-02-24 NOTE — PROGRESS NOTES
NPV referred by Lizzie Brand CNP  Left Shoulder Pain XR Done 1/21/25    Patient has had pain for about 6 months pain progressing radiating into the bicep,No known injury no previous treatment

## 2025-03-11 ENCOUNTER — TELEPHONE (OUTPATIENT)
Dept: PRIMARY CARE | Facility: CLINIC | Age: 70
End: 2025-03-11
Payer: MEDICARE

## 2025-03-11 DIAGNOSIS — G89.29 CHRONIC PAIN OF LEFT KNEE: ICD-10-CM

## 2025-03-11 DIAGNOSIS — G25.81 RESTLESS LEGS SYNDROME: ICD-10-CM

## 2025-03-11 DIAGNOSIS — M54.42 ACUTE LEFT-SIDED LOW BACK PAIN WITH LEFT-SIDED SCIATICA: ICD-10-CM

## 2025-03-11 DIAGNOSIS — M25.562 CHRONIC PAIN OF LEFT KNEE: ICD-10-CM

## 2025-03-11 RX ORDER — LIDOCAINE 50 MG/G
OINTMENT TOPICAL AS NEEDED
Qty: 50 G | Refills: 3 | Status: SHIPPED | OUTPATIENT
Start: 2025-03-11 | End: 2026-03-11

## 2025-03-11 RX ORDER — ROPINIROLE 1 MG/1
1 TABLET, FILM COATED ORAL 3 TIMES DAILY
Qty: 270 TABLET | Refills: 3 | Status: SHIPPED | OUTPATIENT
Start: 2025-03-11 | End: 2026-03-06

## 2025-03-12 ENCOUNTER — TREATMENT (OUTPATIENT)
Dept: PHYSICAL THERAPY | Facility: HOSPITAL | Age: 70
End: 2025-03-12
Payer: MEDICARE

## 2025-03-12 DIAGNOSIS — R29.898 HIP TIGHTNESS: ICD-10-CM

## 2025-03-12 DIAGNOSIS — M75.52 BURSITIS OF LEFT SHOULDER: Primary | ICD-10-CM

## 2025-03-12 DIAGNOSIS — M17.12 OSTEOARTHRITIS OF LEFT PATELLOFEMORAL JOINT: ICD-10-CM

## 2025-03-12 PROCEDURE — 97110 THERAPEUTIC EXERCISES: CPT | Mod: GP | Performed by: PHYSICAL THERAPIST

## 2025-03-12 PROCEDURE — 97140 MANUAL THERAPY 1/> REGIONS: CPT | Mod: GP | Performed by: PHYSICAL THERAPIST

## 2025-03-12 ASSESSMENT — PAIN SCALES - GENERAL: PAINLEVEL_OUTOF10: 4

## 2025-03-12 ASSESSMENT — PAIN - FUNCTIONAL ASSESSMENT: PAIN_FUNCTIONAL_ASSESSMENT: 0-10

## 2025-03-12 NOTE — PROGRESS NOTES
"Physical Therapy    Physical Therapy Treatment    Patient Name: Elin Randhawa  MRN: [id number]  Today's Date: 3/12/2025  Time Calculation  Start Time: 1115  Stop Time: 1200  Time Calculation (min): 45 min    : 1955     PT Therapeutic Procedures Time Entry  Manual Therapy Time Entry: 15  Therapeutic Exercise Time Entry: 30           Visit: # 2      Assessment:  Pt. Is with resistance to IASTM L upper trap mm, and anterior shld. Ktape on L knee quad in a \"y\" shape, pes anserine area, anterior tib area.          Plan:  Cont. Decrease pain in L knee and R shld.      Current Problem  1. Bursitis of left shoulder        2. Hip tightness        3. Osteoarthritis of left patellofemoral joint  Follow Up In Physical Therapy          Subjective   Pt. States the K tape helped her, and she noticed less pain in the L knee with K tape. She will take a pic this time to be able to place tape at home        Pain  Pain Assessment: 0-10  0-10 (Numeric) Pain Score: 4  Pain Type: Chronic pain  Pain Location: Knee  Pain Orientation: Left  Pain Radiating Towards: front of knee and down outside    Objective     See flow sheet       Treatments:  EXERCISES       Date       VISIT# # # # #    REPS REPS REPS REPS          Assess K tape x       Re apply K tape 5'      McConnels tape L knee cap pulling lateral              IASTM   L upper trap mm. 10'             L upper trap mm US              Prone scap squeezes 10x1      Low I's 10x2      Low Y's 10x1      T's 10x1             clamshells RTB 15x2 ea             Quad set with slr    Fwd    abd   10x1  10x1                                                                                                 HEP              OP EDUCATION:  Access Code: [email or web address]  URL: [email or web address]  Date: 2025  Prepared by: Kayleigh Moya    Exercises  - Clamshell  - 1 x daily - 7 x weekly - 3 sets - 10 reps  - Sidelying Hip Abduction  - 1 x daily - 7 x weekly - 3 sets - 10 " reps  - Supine Active Straight Leg Raise  - 1 x daily - 7 x weekly - 3 sets - 10 reps  - Prone Scapular Retraction  - 1 x daily - 7 x weekly - 3 sets - 10 reps  - Prone Scapular Slide with Shoulder Extension  - 1 x daily - 7 x weekly - 3 sets - 10 reps  - Prone Scapular Retraction Arms at Side  - 1 x daily - 7 x weekly - 3 sets - 10 reps2     Goals:  Active       L shld bursitis; L knee PF ; hip tightness       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        Start:  02/24/25    Expected End:  04/24/25            Pt. Will be indep with progressive HEP to cont. Progress made in PT.  (Progressing)       Start:  02/24/25    Expected End:  05/24/25            Pt. 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        Start:  02/24/25    Expected End:  05/24/25            Pt. will increase L hip flexibility to wnl to decrease L knee pain with all exercise classes.        Start:  02/24/25    Expected End:  05/24/25

## 2025-03-17 ENCOUNTER — TREATMENT (OUTPATIENT)
Dept: PHYSICAL THERAPY | Facility: HOSPITAL | Age: 70
End: 2025-03-17
Payer: MEDICARE

## 2025-03-17 DIAGNOSIS — M17.12 OSTEOARTHRITIS OF LEFT PATELLOFEMORAL JOINT: ICD-10-CM

## 2025-03-17 DIAGNOSIS — R29.898 HIP TIGHTNESS: ICD-10-CM

## 2025-03-17 DIAGNOSIS — M75.52 BURSITIS OF LEFT SHOULDER: Primary | ICD-10-CM

## 2025-03-17 PROCEDURE — 97110 THERAPEUTIC EXERCISES: CPT | Mod: GP | Performed by: PHYSICAL THERAPIST

## 2025-03-17 PROCEDURE — 97140 MANUAL THERAPY 1/> REGIONS: CPT | Mod: GP | Performed by: PHYSICAL THERAPIST

## 2025-03-17 ASSESSMENT — PAIN - FUNCTIONAL ASSESSMENT: PAIN_FUNCTIONAL_ASSESSMENT: 0-10

## 2025-03-17 ASSESSMENT — PAIN SCALES - GENERAL: PAINLEVEL_OUTOF10: 6

## 2025-03-17 NOTE — PROGRESS NOTES
Physical Therapy    Physical Therapy Treatment    Patient Name: Elin Randhawa  MRN: [id number]  Today's Date: 3/17/2025  Time Calculation  Start Time: 1515  Stop Time: 1600  Time Calculation (min): 45 min    [facility]1955     PT Therapeutic Procedures Time Entry  Manual Therapy Time Entry: 15  Therapeutic Exercise Time Entry: 30           Visit: # 3      Assessment:  Decreased resistance with IASTM L upper trap, levetor scapulae, and SCM. Pt. Is indep with current HEP. McConnels tape to L knee with lateral pull.          Plan:  Assess McConnells tape vs ktape benefits; IASTM as needed.      Current Problem  1. Bursitis of left shoulder        2. Hip tightness        3. Osteoarthritis of left patellofemoral joint  Follow Up In Physical Therapy          Subjective   Pt. With c/o kinesiotape helped. Pt. With questions about clamshells.         Pain  Pain Assessment: 0-10  0-10 (Numeric) Pain Score: 6  Pain Type: Chronic pain  Pain Location: Knee  Pain Orientation: Left  Pain Radiating Towards: front of knee down side; shoulder    Objective            Treatments:     EXERCISES       Date  3/17/25     VISIT# #2 #3 # #    REPS REPS REPS REPS     Review HEP     Assess K tape x       Re apply K tape 5'      McConnels tape L knee cap pulling lateral  x            IASTM   L upper trap mm. 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        Start:  02/24/25    Expected End:  04/24/25            Pt. Will be indep with progressive HEP to cont. Progress made in PT.  (Progressing)        Start:  02/24/25    Expected End:  05/24/25            Pt. 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        Start:  02/24/25    Expected End:  05/24/25            Pt. will increase L hip flexibility to wnl to decrease L knee pain with all exercise classes.        Start:  02/24/25    Expected End:  05/24/25

## 2025-03-27 ENCOUNTER — TREATMENT (OUTPATIENT)
Dept: PHYSICAL THERAPY | Facility: HOSPITAL | Age: 70
End: 2025-03-27
Payer: MEDICARE

## 2025-03-27 DIAGNOSIS — M17.12 OSTEOARTHRITIS OF LEFT PATELLOFEMORAL JOINT: ICD-10-CM

## 2025-03-27 DIAGNOSIS — M75.52 BURSITIS OF LEFT SHOULDER: Primary | ICD-10-CM

## 2025-03-27 DIAGNOSIS — R29.898 HIP TIGHTNESS: ICD-10-CM

## 2025-03-27 PROCEDURE — 97140 MANUAL THERAPY 1/> REGIONS: CPT | Mod: GP | Performed by: PHYSICAL THERAPIST

## 2025-03-27 PROCEDURE — 97110 THERAPEUTIC EXERCISES: CPT | Mod: GP | Performed by: PHYSICAL THERAPIST

## 2025-03-27 ASSESSMENT — PAIN - FUNCTIONAL ASSESSMENT: PAIN_FUNCTIONAL_ASSESSMENT: 0-10

## 2025-03-27 ASSESSMENT — PAIN SCALES - GENERAL: PAINLEVEL_OUTOF10: 4

## 2025-03-27 NOTE — PROGRESS NOTES
Physical Therapy    Physical Therapy Treatment / Discharge    Patient Name: Elin Randhawa  MRN: [id number]  Today's Date: 3/27/2025  Time Calculation  Start Time: 1650  Stop Time: 1725  Time Calculation (min): 35 min    [id number]     PT Therapeutic Procedures Time Entry  Manual Therapy Time Entry: 20  Therapeutic Exercise Time Entry: 15           Visit: # 4      Assessment:  Pt. Is indep with HEP and education of scapular retraction posture supporting shoulder. Pt. Is cont with chronic knee valgus posture, she is indep with hip ER stretching. D/t increased valgus her R knee cap with increase medial glide. Kinesiotape has helped and pt. Is indep with self taping with an OTC tape similar. McConnel's tape has helped as well and pt. Would benefit from good quality PF knee brace with lateral glide of patella. Pt. Has benefited from IASTM L upper trap and mid trap mm. She will cont with scapular strengthening and self massage / massage therapist.          Plan:  Discharge PT     Current Problem  1. Bursitis of left shoulder        2. Hip tightness        3. Osteoarthritis of left patellofemoral joint  Follow Up In Physical Therapy          Subjective   Pt. States McConnel's tape has helped. She is using an OTC form of Kinesiotape at home and is indep with this and it also helps.         Pain  Pain Assessment: 0-10  0-10 (Numeric) Pain Score: 4  Pain Type: Chronic pain  Pain Location: Knee  Pain Orientation: Left  Pain Radiating Towards: shld R also    Objective      L upper trap with mild to moderate resistance with IASTM.    L shld AROM wnl   L shld strength wnl      Treatments:     EXERCISES   recheck    Date  3/17/25 3/27/25    VISIT# #2 #3 #4 #    REPS REPS REPS REPS     Review HEP Review HEP    Assess K tape x  Recheck      Re apply K tape 5'      McConnels tape L knee cap pulling lateral  x x           IASTM   L upper trap mm. 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  (Adequate for Discharge)       Start:  02/24/25    Expected End:  04/24/25            Pt. Will be indep with progressive HEP to cont. Progress made in PT.  (Met)       Start:  02/24/25    Expected End:  05/24/25    Resolved:  03/27/25         Pt. 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  (Met)       Start:  02/24/25    Expected End:  05/24/25    Resolved:  03/27/25         Pt. will increase L hip flexibility to wnl to decrease L knee pain with all exercise classes.  (Adequate for Discharge)       Start:  02/24/25    Expected End:  05/24/25

## 2025-04-07 ENCOUNTER — APPOINTMENT (OUTPATIENT)
Dept: PHYSICAL THERAPY | Facility: HOSPITAL | Age: 70
End: 2025-04-07
Payer: MEDICARE

## 2025-04-07 DIAGNOSIS — R29.898 HIP TIGHTNESS: ICD-10-CM

## 2025-04-07 DIAGNOSIS — M75.52 BURSITIS OF LEFT SHOULDER: Primary | ICD-10-CM

## 2025-04-15 ENCOUNTER — TELEPHONE (OUTPATIENT)
Dept: PRIMARY CARE | Facility: CLINIC | Age: 70
End: 2025-04-15

## 2025-04-15 ENCOUNTER — APPOINTMENT (OUTPATIENT)
Dept: PRIMARY CARE | Facility: CLINIC | Age: 70
End: 2025-04-15
Payer: MEDICARE

## 2025-04-15 VITALS
HEIGHT: 59 IN | OXYGEN SATURATION: 96 % | BODY MASS INDEX: 21.77 KG/M2 | DIASTOLIC BLOOD PRESSURE: 82 MMHG | HEART RATE: 68 BPM | SYSTOLIC BLOOD PRESSURE: 122 MMHG | WEIGHT: 108 LBS

## 2025-04-15 DIAGNOSIS — R51.9 NONINTRACTABLE HEADACHE, UNSPECIFIED CHRONICITY PATTERN, UNSPECIFIED HEADACHE TYPE: ICD-10-CM

## 2025-04-15 DIAGNOSIS — G47.00 INSOMNIA, UNSPECIFIED TYPE: ICD-10-CM

## 2025-04-15 DIAGNOSIS — F41.9 MILD ANXIETY: Primary | ICD-10-CM

## 2025-04-15 DIAGNOSIS — Z00.00 HEALTH CARE MAINTENANCE: ICD-10-CM

## 2025-04-15 DIAGNOSIS — R25.2 LEG CRAMP: ICD-10-CM

## 2025-04-15 PROCEDURE — 3008F BODY MASS INDEX DOCD: CPT

## 2025-04-15 PROCEDURE — 1159F MED LIST DOCD IN RCRD: CPT

## 2025-04-15 PROCEDURE — 99214 OFFICE O/P EST MOD 30 MIN: CPT

## 2025-04-15 PROCEDURE — 1123F ACP DISCUSS/DSCN MKR DOCD: CPT

## 2025-04-15 PROCEDURE — 1160F RVW MEDS BY RX/DR IN RCRD: CPT

## 2025-04-15 PROCEDURE — G2211 COMPLEX E/M VISIT ADD ON: HCPCS

## 2025-04-15 PROCEDURE — 1036F TOBACCO NON-USER: CPT

## 2025-04-15 ASSESSMENT — PATIENT HEALTH QUESTIONNAIRE - PHQ9
1. LITTLE INTEREST OR PLEASURE IN DOING THINGS: NOT AT ALL
SUM OF ALL RESPONSES TO PHQ9 QUESTIONS 1 AND 2: 0
2. FEELING DOWN, DEPRESSED OR HOPELESS: NOT AT ALL

## 2025-04-15 ASSESSMENT — ENCOUNTER SYMPTOMS
OCCASIONAL FEELINGS OF UNSTEADINESS: 0
LOSS OF SENSATION IN FEET: 0
HEADACHES: 1
DEPRESSION: 0

## 2025-04-15 ASSESSMENT — COLUMBIA-SUICIDE SEVERITY RATING SCALE - C-SSRS
1. IN THE PAST MONTH, HAVE YOU WISHED YOU WERE DEAD OR WISHED YOU COULD GO TO SLEEP AND NOT WAKE UP?: NO
6. HAVE YOU EVER DONE ANYTHING, STARTED TO DO ANYTHING, OR PREPARED TO DO ANYTHING TO END YOUR LIFE?: NO
2. HAVE YOU ACTUALLY HAD ANY THOUGHTS OF KILLING YOURSELF?: NO

## 2025-04-15 NOTE — PATIENT INSTRUCTIONS
I would start using Flonase nasal spray to help with the allergy symptoms and using a humidifier at night. This may also help the headaches they could be allergy related.       - Magnesium (preferably glycinate but oxide or sulfate are acceptable 400-500 mg daily is recommended as a potential migraine preventative treatment by the [employer]. Side effects include diarrhea.  - Riboflavin (vitamin B2) can be another beneficial migraine preventative treatment, 200 mg twice a day.  - Another potential beneficial migraine preventative treatment is coenzyme Q10 150 mg twice a day.  - Melatonin 3-5 mg 2-4 hours before bedtime can be helpful for difficulty with sleeping and for headaches, especially cluster headaches but also migraines.  - Avoid triggers that can cause or worsen migraines (food, lack of sleep, stress)  - Keep a diary of your headaches to note how often you get them, how long they last and any other helpful information  - Avoid taking medication for treatment of headaches (NOT preventative medication) more than 3 days per week. This includes both prescription medication and over the counter medication.  - Take your preventative medication as directed. Let me know if you have side effects or problems with the medication. Do not suddenly stop taking the medication.

## 2025-04-15 NOTE — PROGRESS NOTES
"Subjective   Patient ID: Elin Randhawa is a 69 y.o. female who presents for Follow-up (Discuss headaches and cramps ).    Past Medical, Surgical, and Family History reviewed and updated in chart.     Reviewed all medications by prescribing practitioner or clinical pharmacist (such as prescriptions, OTCs, herbal therapies and supplements) and documented in the medical record.    HPI   69 yof in office for follow up. PMH of anxiety, insomnia and lumbar pain.   Most recent lab work discussed.     Waking up with headaches, dull ache. Daily once up and moving it goes away started about a month ago. Has been taking naproxen has been taking for back and knee, takes in the after noon/evening.    Having increase with cramps to bilateral to lower legs and toes. Started using ankle support at night and has helped some. Only happens usually at nighttime while laying down.     Review of Systems   Musculoskeletal:         See HPI   Neurological:  Positive for headaches.       Objective   /82   Pulse 68   Ht 1.499 m (4' 11\")   Wt 49 kg (108 lb)   SpO2 96%   BMI 21.81 kg/m²     Physical Exam  Constitutional:       Appearance: Normal appearance.   Cardiovascular:      Rate and Rhythm: Normal rate and regular rhythm.      Pulses: Normal pulses.      Heart sounds: Normal heart sounds.   Pulmonary:      Effort: Pulmonary effort is normal.      Breath sounds: Normal breath sounds.   Neurological:      Mental Status: She is alert and oriented to person, place, and time.         Assessment/Plan   Problem List Items Addressed This Visit       Insomnia     takes clonazepam daily for sleep and anxiety and is working well for her.     OAARS report was reviewed with no red flags noted, urine drug screen up to date, CSA up to date. risk of diversion assessed and not suspected.            Mild anxiety - Primary     takes clonazepam daily for sleep and anxiety and is working well for her.    OAARS report was reviewed with no red " flags noted, urine drug screen up to date, CSA up to date. risk of diversion assessed and not suspected.             Other Visit Diagnoses       Health care maintenance        Relevant Orders    Follow Up In [facility] - PCP - Established    Leg cramp        Having increase with cramps to bilateral to lower legs and toes. Started using ankle support at night and has helped some. Only happens usually at nighttime while laying down.  Recommended to take magnesium glycinate, COQ10 at night and use magnesium spray as needed to leg cramping.     Nonintractable headache, unspecified chronicity pattern, unspecified headache type       Lightly allergy related, patient will  start using Flonase nasal spray to help with the allergy symptoms and using a humidifier at night. Follow up in not better.

## 2025-04-15 NOTE — ASSESSMENT & PLAN NOTE
takes clonazepam daily for sleep and anxiety and is working well for her.     OAARS report was reviewed with no red flags noted, urine drug screen up to date, CSA up to date. risk of diversion assessed and not suspected.

## 2025-04-16 ENCOUNTER — APPOINTMENT (OUTPATIENT)
Dept: PRIMARY CARE | Facility: CLINIC | Age: 70
End: 2025-04-16
Payer: MEDICARE

## 2025-04-16 ENCOUNTER — TELEPHONE (OUTPATIENT)
Dept: PRIMARY CARE | Facility: CLINIC | Age: 70
End: 2025-04-16

## 2025-04-16 NOTE — TELEPHONE ENCOUNTER
The acupuncture order is dated 1 day past the patients office visit   Can you back date the doctor orders from 4/14 as patient was seen on that date.  Please fax to [phone]

## 2025-04-25 ENCOUNTER — TELEPHONE (OUTPATIENT)
Dept: PRIMARY CARE | Facility: CLINIC | Age: 70
End: 2025-04-25
Payer: MEDICARE

## 2025-06-07 ENCOUNTER — OFFICE VISIT (OUTPATIENT)
Dept: URGENT CARE | Age: 70
End: 2025-06-07
Payer: MEDICARE

## 2025-06-07 VITALS
RESPIRATION RATE: 18 BRPM | SYSTOLIC BLOOD PRESSURE: 163 MMHG | OXYGEN SATURATION: 97 % | HEART RATE: 71 BPM | DIASTOLIC BLOOD PRESSURE: 97 MMHG | TEMPERATURE: 97.9 F

## 2025-06-07 DIAGNOSIS — R30.0 DYSURIA: ICD-10-CM

## 2025-06-07 DIAGNOSIS — R03.0 ELEVATED BLOOD PRESSURE READING: ICD-10-CM

## 2025-06-07 DIAGNOSIS — N30.01 ACUTE CYSTITIS WITH HEMATURIA: Primary | ICD-10-CM

## 2025-06-07 LAB
POC APPEARANCE, URINE: CLEAR
POC BILIRUBIN, URINE: ABNORMAL
POC BLOOD, URINE: NEGATIVE
POC COLOR, URINE: ABNORMAL
POC GLUCOSE, URINE: NEGATIVE MG/DL
POC KETONES, URINE: NEGATIVE MG/DL
POC LEUKOCYTES, URINE: ABNORMAL
POC NITRITE,URINE: POSITIVE
POC PH, URINE: 6 PH
POC PROTEIN, URINE: NEGATIVE MG/DL
POC SPECIFIC GRAVITY, URINE: 1.01
POC UROBILINOGEN, URINE: 2 EU/DL

## 2025-06-07 RX ORDER — NITROFURANTOIN 25; 75 MG/1; MG/1
100 CAPSULE ORAL 2 TIMES DAILY
Qty: 14 CAPSULE | Refills: 0 | Status: SHIPPED | OUTPATIENT
Start: 2025-06-07 | End: 2025-06-14

## 2025-06-07 ASSESSMENT — ENCOUNTER SYMPTOMS
FREQUENCY: 1
DIFFICULTY URINATING: 1
ABDOMINAL PAIN: 0
VOMITING: 0
ACTIVITY CHANGE: 0
DIAPHORESIS: 0
FATIGUE: 1
HEMATURIA: 0
DYSURIA: 1
CONSTIPATION: 0
CHILLS: 0
NAUSEA: 0
FLANK PAIN: 0
APPETITE CHANGE: 0
DIZZINESS: 0
SHORTNESS OF BREATH: 0
COUGH: 0
MYALGIAS: 0

## 2025-06-07 NOTE — PROGRESS NOTES
Subjective   Patient ID: Elin Randhawa is a 69 y.o. female. They present today with a chief complaint of Difficulty Urinating.    History of Present Illness    History provided by:  Patient  Difficulty Urinating  Associated symptoms: no abdominal pain, no flank pain, no nausea and no vomiting      Patient states that she has had painful urination and frequency since Wednesday.  She did take Azo.  Denies fever and chills.  Last UTI was over 2 years ago.  She denies kidney stones.  She does have some mild low back and suprapubic pain.    Past Medical History  Allergies as of 06/07/2025    (No Known Allergies)       Prescriptions Prior to Admission[1]     Medical History[2]    Surgical History[3]     reports that she has never smoked. She has never used smokeless tobacco. She reports that she does not drink alcohol and does not use drugs.    Review of Systems  Review of Systems   Constitutional:  Positive for fatigue. Negative for activity change, appetite change, chills and diaphoresis.   Respiratory:  Negative for cough and shortness of breath.    Cardiovascular:  Negative for chest pain.   Gastrointestinal:  Negative for abdominal pain, constipation, nausea and vomiting.   Genitourinary:  Positive for difficulty urinating, dysuria, frequency and urgency. Negative for enuresis, flank pain and hematuria.   Musculoskeletal:  Negative for myalgias.   Neurological:  Negative for dizziness.                                  Objective    Vitals:    06/07/25 1358   BP: (!) 163/97   Pulse: 71   Resp: 18   Temp: 36.6 °C (97.9 °F)   TempSrc: Oral   SpO2: 97%     No LMP recorded. Patient is postmenopausal.    Physical Exam  Constitutional:       General: She is not in acute distress.  Cardiovascular:      Rate and Rhythm: Normal rate and regular rhythm.      Heart sounds: Normal heart sounds.   Pulmonary:      Effort: Pulmonary effort is normal.   Abdominal:      Palpations: Abdomen is soft.      Tenderness: There is no  abdominal tenderness. There is no right CVA tenderness, left CVA tenderness or guarding.   Skin:     Findings: No rash.   Psychiatric:         Mood and Affect: Mood normal.         Behavior: Behavior normal.         Procedures    Point of Care Test & Imaging Results from this visit  Results for orders placed or performed in visit on 06/07/25   POCT UA Automated manually resulted   Result Value Ref Range    POC Color, Urine Orange (A) Straw, Yellow, Light-Yellow    POC Appearance, Urine Clear Clear    POC Glucose, Urine NEGATIVE NEGATIVE mg/dl    POC Bilirubin, Urine SMALL (1+) (A) NEGATIVE    POC Ketones, Urine NEGATIVE NEGATIVE mg/dl    POC Specific Gravity, Urine 1.010 1.005 - 1.035    POC Blood, Urine NEGATIVE NEGATIVE    POC PH, Urine 6.0 No Reference Range Established PH    POC Protein, Urine NEGATIVE NEGATIVE mg/dl    POC Urobilinogen, Urine 2.0 (A) 0.2, 1.0 EU/DL    Poc Nitrite, Urine POSITIVE (A) NEGATIVE    POC Leukocytes, Urine MODERATE (2+) (A) NEGATIVE      Imaging  No results found.    Cardiology, Vascular, and Other Imaging  No other imaging results found for the past 2 days      Diagnostic study results (if any) were reviewed by KIRILL Vogel.    Assessment/Plan   Allergies, medications, history, and pertinent labs/EKGs/Imaging reviewed by KIRILL Vogel.     Medical Decision Making  History, examination, and urine dipstick result are consistent with uncomplicated UTI without evidence of pyelonephritis or sepsis. Patient will be given antibiotic therapy and cultures pending. Supportive measures. Return to clinic or present to ED if symptoms change or worsen. Otherwise follow with PCP. Patient verbalized understanding and agrees with plan.       Orders and Diagnoses  Diagnoses and all orders for this visit:  Acute cystitis with hematuria  -     nitrofurantoin, macrocrystal-monohydrate, (Macrobid) 100 mg capsule; Take 1 capsule (100 mg) by mouth 2 times a day for 7  days.  Dysuria  -     POCT UA Automated manually resulted  -     Urine Culture  Elevated blood pressure reading      Medical Admin Record      Patient disposition: Home    Electronically signed by KIRILL Vogel  3:09 PM           [1] (Not in a hospital admission)   [2]   Past Medical History:  Diagnosis Date    Acute upper respiratory infection, unspecified 12/02/2016    Acute upper respiratory infection    Arthritis     Encounter for general adult medical examination without abnormal findings 12/02/2016    Annual physical exam    Encounter for screening for malignant neoplasm of colon 01/05/2016    Screening for colorectal cancer    Impacted cerumen, bilateral 05/31/2016    Impacted cerumen of both ears    Pain in right hip 05/31/2016    Right hip pain    Personal history of other specified conditions 12/08/2020    History of dysuria   [3]   Past Surgical History:  Procedure Laterality Date    TUBAL LIGATION  10/06/2014    Tubal Ligation

## 2025-06-10 LAB — BACTERIA UR CULT: ABNORMAL

## 2025-06-11 ENCOUNTER — APPOINTMENT (OUTPATIENT)
Dept: ORTHOPEDIC SURGERY | Facility: HOSPITAL | Age: 70
End: 2025-06-11
Payer: MEDICARE

## 2025-06-11 VITALS — WEIGHT: 108 LBS | BODY MASS INDEX: 21.77 KG/M2 | HEIGHT: 59 IN

## 2025-06-11 DIAGNOSIS — M17.12 PRIMARY OSTEOARTHRITIS OF LEFT KNEE: Primary | ICD-10-CM

## 2025-06-11 PROCEDURE — 99214 OFFICE O/P EST MOD 30 MIN: CPT | Performed by: SPECIALIST

## 2025-06-11 PROCEDURE — 1159F MED LIST DOCD IN RCRD: CPT | Performed by: SPECIALIST

## 2025-06-11 PROCEDURE — 3008F BODY MASS INDEX DOCD: CPT | Performed by: SPECIALIST

## 2025-06-11 PROCEDURE — 99212 OFFICE O/P EST SF 10 MIN: CPT | Performed by: SPECIALIST

## 2025-06-11 ASSESSMENT — ENCOUNTER SYMPTOMS
OCCASIONAL FEELINGS OF UNSTEADINESS: 0
LOSS OF SENSATION IN FEET: 0
DEPRESSION: 0

## 2025-06-11 NOTE — PROGRESS NOTES
Follow Up Left Knee Pain   Last injected 2/5/2025    Patient states she got about 2 months of relief from her previous injection, she states the pain radiates upward into the back, she would like to discuss other treatment options   MUSCULOSKELETAL:  Laterality: Left knee Exam  - Alignment: partially correctible varus deformity  - ROM: Full with mild patellofemoral crepitus  - Effusion: none  - Strength: knee extension and flexion 5/5, EHL/PF/DF motor intact  - Palpation: TTP mostly along medial joint line  - Stability: Anterior/Posterior stable, varus/valgus stable. Mild pseudo valgus instability  - Gait: mild antalgic  - Hip Exam: flexion to 100+ degrees, full extension, internal/external rotation adequate, and no pain with log roll  - Special Tests: none performed     NEUROVASCULAR:  - Neurovascular Status: sensation intact to light touch distally  - Capillary refill brisk at extremities, Bilateral dorsalis pedis pulse 2+     RADIOGRAPHS moderate osteoarthritis left knee mostly patellofemoral     ASSESSMENT left knee osteoarthritis/patellofemoral.  She will continue with home exercise program as outlined by PT and today we wrote a prescription for a patellar tracking brace.  Follow-up if no improvement

## 2025-07-17 RX ORDER — GABAPENTIN 100 MG/1
CAPSULE ORAL
COMMUNITY
End: 2025-07-21 | Stop reason: ALTCHOICE

## 2025-07-21 ENCOUNTER — APPOINTMENT (OUTPATIENT)
Dept: PRIMARY CARE | Facility: CLINIC | Age: 70
End: 2025-07-21
Payer: MEDICARE

## 2025-07-21 VITALS
WEIGHT: 112 LBS | OXYGEN SATURATION: 97 % | SYSTOLIC BLOOD PRESSURE: 132 MMHG | DIASTOLIC BLOOD PRESSURE: 80 MMHG | HEIGHT: 59 IN | HEART RATE: 89 BPM | BODY MASS INDEX: 22.58 KG/M2

## 2025-07-21 DIAGNOSIS — F41.9 MILD ANXIETY: ICD-10-CM

## 2025-07-21 DIAGNOSIS — M17.12 OSTEOARTHRITIS OF LEFT PATELLOFEMORAL JOINT: ICD-10-CM

## 2025-07-21 DIAGNOSIS — Z00.00 HEALTH CARE MAINTENANCE: ICD-10-CM

## 2025-07-21 DIAGNOSIS — G47.00 INSOMNIA, UNSPECIFIED TYPE: Primary | ICD-10-CM

## 2025-07-21 PROCEDURE — 99213 OFFICE O/P EST LOW 20 MIN: CPT

## 2025-07-21 PROCEDURE — 1159F MED LIST DOCD IN RCRD: CPT

## 2025-07-21 PROCEDURE — 1160F RVW MEDS BY RX/DR IN RCRD: CPT

## 2025-07-21 PROCEDURE — 1036F TOBACCO NON-USER: CPT

## 2025-07-21 PROCEDURE — 3008F BODY MASS INDEX DOCD: CPT

## 2025-07-21 RX ORDER — CLONAZEPAM 1 MG/1
1 TABLET ORAL 2 TIMES DAILY
Qty: 180 TABLET | Refills: 1 | Status: SHIPPED | OUTPATIENT
Start: 2025-07-21 | End: 2026-01-17

## 2025-07-21 ASSESSMENT — ENCOUNTER SYMPTOMS
LOSS OF SENSATION IN FEET: 0
ENDOCRINE NEGATIVE: 1
HEMATOLOGIC/LYMPHATIC NEGATIVE: 1
NEUROLOGICAL NEGATIVE: 1
GASTROINTESTINAL NEGATIVE: 1
RESPIRATORY NEGATIVE: 1
CONSTITUTIONAL NEGATIVE: 1
ALLERGIC/IMMUNOLOGIC NEGATIVE: 1
PSYCHIATRIC NEGATIVE: 1
EYES NEGATIVE: 1
MUSCULOSKELETAL NEGATIVE: 1
CARDIOVASCULAR NEGATIVE: 1
OCCASIONAL FEELINGS OF UNSTEADINESS: 0
DEPRESSION: 0

## 2025-07-21 ASSESSMENT — ANXIETY QUESTIONNAIRES
GAD7 TOTAL SCORE: 0
1. FEELING NERVOUS, ANXIOUS, OR ON EDGE: NOT AT ALL
3. WORRYING TOO MUCH ABOUT DIFFERENT THINGS: NOT AT ALL
7. FEELING AFRAID AS IF SOMETHING AWFUL MIGHT HAPPEN: NOT AT ALL
IF YOU CHECKED OFF ANY PROBLEMS ON THIS QUESTIONNAIRE, HOW DIFFICULT HAVE THESE PROBLEMS MADE IT FOR YOU TO DO YOUR WORK, TAKE CARE OF THINGS AT HOME, OR GET ALONG WITH OTHER PEOPLE: NOT DIFFICULT AT ALL
6. BECOMING EASILY ANNOYED OR IRRITABLE: NOT AT ALL
4. TROUBLE RELAXING: NOT AT ALL
5. BEING SO RESTLESS THAT IT IS HARD TO SIT STILL: NOT AT ALL
2. NOT BEING ABLE TO STOP OR CONTROL WORRYING: NOT AT ALL

## 2025-07-21 NOTE — PATIENT INSTRUCTIONS
Problem List Items Addressed This Visit       Insomnia - Primary    takes clonazepam daily for sleep and anxiety and is working well for her.     [facility] report was reviewed with no red flags noted, urine drug screen up to date, CSA up to date. risk of diversion assessed and not suspected.          Mild anxiety    takes clonazepam daily for sleep and anxiety and is working well for her.     OAARS report was reviewed with no red flags noted, urine drug screen up to date, CSA up to date. risk of diversion assessed and not suspected.          Relevant Medications    clonazePAM (KlonoPIN) 1 mg tablet    Other Relevant Orders    Follow Up In [facility] - PCP - Established    Osteoarthritis of left patellofemoral joint    Followed with , is to wear a left patellar tracking brace. Remains to state has knee pain.           Other Visit Diagnoses         Health care maintenance        Relevant Orders    Follow Up In [facility] - PCP - Established

## 2025-07-21 NOTE — PROGRESS NOTES
Subjective   Patient ID: Elin Randhawa is a 69 y.o. female who presents for Follow-up (Pt here for follow up, talk about her knee.).    Past Medical, Surgical, and Family History reviewed and updated in chart.     Reviewed all medications by prescribing practitioner or clinical pharmacist (such as prescriptions, OTCs, herbal therapies and supplements) and documented in the medical record.  [facility]:  Lizzie Brand, JESS-CNP on 7/21/2025  9:32 AM  I have personally reviewed the OAR[facility] report for Elin Randhawa. I have considered the risks of abuse, dependence, addiction and diversion    Is the patient prescribed a combination of a benzodiazepine and opioid?  No    Last Urine Drug Screen / ordered today: No  Recent Results (from the past 8760 hours)   Drug Screen, Urine With Reflex to Confirmation    Collection Time: 01/21/25  8:36 AM   Result Value Ref Range    Amphetamine Screen, Urine Presumptive Negative Presumptive Negative    Barbiturate Screen, Urine Presumptive Negative Presumptive Negative    Benzodiazepines Screen, Urine Presumptive Negative Presumptive Negative    Cannabinoid Screen, Urine Presumptive Negative Presumptive Negative    Cocaine Metabolite Screen, Urine Presumptive Negative Presumptive Negative    Fentanyl Screen, Urine Presumptive Negative Presumptive Negative    Opiate Screen, Urine Presumptive Negative Presumptive Negative    Oxycodone Screen, Urine Presumptive Negative Presumptive Negative    PCP Screen, Urine Presumptive Negative Presumptive Negative    Methadone Screen, Urine Presumptive Negative Presumptive Negative     Results are as expected.         Controlled Substance Agreement:  Date of the Last Agreement: 1/21/25  Reviewed Controlled Substance Agreement including but not limited to the benefits, risks, and alternatives to treatment with a Controlled Substance medication(s).    Benzodiazepines:  What is the patient's goal of therapy? anxiety  Is this being achieved with  "current treatment? yes    KARINA-7:  Over the last 2 weeks, how often have you been bothered by any of the following problems?  Feeling nervous, anxious, or on edge: 0  Not being able to stop or control worryin  Worrying too much about different things: 0  Trouble relaxin  Being so restless that it is hard to sit still: 0  Becoming easily annoyed or irritable: 0  Feeling afraid as if something awful might happen: 0  KARINA-7 Total Score: 0        Activities of Daily Living:   Is your overall impression that this patient is benefiting (symptom reduction outweighs side effects) from benzodiazepine therapy? Yes     1. Physical Functioning: Better  2. Family Relationship: Better  3. Social Relationship: Better  4. Mood: Better  5. Sleep Patterns: Better  6. Overall Function: Better    HPI   69 yof in office for follow up. PMH of anxiety, insomnia and lumbar pain.     Followed with , is to wear a left patellar tracking brace. Remains to state has knee pain.     Review of Systems   Constitutional: Negative.    HENT: Negative.     Eyes: Negative.    Respiratory: Negative.     Cardiovascular: Negative.    Gastrointestinal: Negative.    Endocrine: Negative.    Genitourinary: Negative.    Musculoskeletal: Negative.         Right knee pain   Skin: Negative.    Allergic/Immunologic: Negative.    Neurological: Negative.    Hematological: Negative.    Psychiatric/Behavioral: Negative.     All other systems reviewed and are negative.      Objective   /80 (BP Location: Left arm, Patient Position: Sitting)   Pulse 89   Ht (!) 1.499 m (4' 11\")   Wt 50.8 kg (112 lb)   SpO2 97%   BMI 22.62 kg/m²     Physical Exam    Musculoskeletal:      Comments: Right knee brace         Assessment/Plan   Problem List Items Addressed This Visit       Insomnia - Primary    takes clonazepam daily for sleep and anxiety and is working well for her.     OAARS report was reviewed with no red flags noted, urine drug screen up to date, " CSA up to date. risk of diversion assessed and not suspected.          Mild anxiety    takes clonazepam daily for sleep and anxiety and is working well for her.     OAARS report was reviewed with no red flags noted, urine drug screen up to date, CSA up to date. risk of diversion assessed and not suspected.          Relevant Medications    clonazePAM (KlonoPIN) 1 mg tablet    Other Relevant Orders    Follow Up In [facility] - PCP - Established    Osteoarthritis of left patellofemoral joint    Followed with , is to wear a left patellar tracking brace. Remains to state has knee pain.           Other Visit Diagnoses         Health care maintenance        Relevant Orders    Follow Up In [facility] - PCP - Established

## 2025-09-02 ENCOUNTER — HOSPITAL ENCOUNTER (OUTPATIENT)
Dept: RADIOLOGY | Facility: HOSPITAL | Age: 70
Discharge: HOME | End: 2025-09-02
Payer: MEDICARE

## 2025-09-02 ENCOUNTER — OFFICE VISIT (OUTPATIENT)
Dept: ORTHOPEDIC SURGERY | Facility: HOSPITAL | Age: 70
End: 2025-09-02
Payer: MEDICARE

## 2025-09-02 VITALS — BODY MASS INDEX: 22.58 KG/M2 | HEIGHT: 59 IN | WEIGHT: 112 LBS

## 2025-09-02 DIAGNOSIS — M17.11 PRIMARY OSTEOARTHRITIS OF RIGHT KNEE: ICD-10-CM

## 2025-09-02 DIAGNOSIS — M17.0 PRIMARY OSTEOARTHRITIS OF BOTH KNEES: Primary | ICD-10-CM

## 2025-09-02 DIAGNOSIS — R52 PAIN: ICD-10-CM

## 2025-09-02 PROCEDURE — 2500000004 HC RX 250 GENERAL PHARMACY W/ HCPCS (ALT 636 FOR OP/ED): Performed by: SPECIALIST

## 2025-09-02 PROCEDURE — 20610 DRAIN/INJ JOINT/BURSA W/O US: CPT | Mod: 50 | Performed by: SPECIALIST

## 2025-09-02 PROCEDURE — 73562 X-RAY EXAM OF KNEE 3: CPT | Mod: RT

## 2025-09-02 PROCEDURE — 73560 X-RAY EXAM OF KNEE 1 OR 2: CPT | Mod: LT

## 2025-09-02 PROCEDURE — 99213 OFFICE O/P EST LOW 20 MIN: CPT | Mod: 25 | Performed by: SPECIALIST

## 2025-09-02 RX ADMIN — LIDOCAINE HYDROCHLORIDE 5 ML: 20 INJECTION, SOLUTION INFILTRATION; PERINEURAL at 07:47

## 2025-09-02 RX ADMIN — TRIAMCINOLONE ACETONIDE 40 MG: 40 INJECTION, SUSPENSION INTRA-ARTICULAR; INTRAMUSCULAR at 07:47

## 2025-09-02 ASSESSMENT — ENCOUNTER SYMPTOMS
OCCASIONAL FEELINGS OF UNSTEADINESS: 0
LOSS OF SENSATION IN FEET: 0
DEPRESSION: 0

## 2025-09-03 RX ORDER — TRIAMCINOLONE ACETONIDE 40 MG/ML
40 INJECTION, SUSPENSION INTRA-ARTICULAR; INTRAMUSCULAR
Status: COMPLETED | OUTPATIENT
Start: 2025-09-02 | End: 2025-09-02

## 2025-09-03 RX ORDER — LIDOCAINE HYDROCHLORIDE 20 MG/ML
5 INJECTION, SOLUTION INFILTRATION; PERINEURAL
Status: COMPLETED | OUTPATIENT
Start: 2025-09-02 | End: 2025-09-02

## 2025-10-20 ENCOUNTER — APPOINTMENT (OUTPATIENT)
Dept: PRIMARY CARE | Facility: CLINIC | Age: 70
End: 2025-10-20
Payer: MEDICARE

## 2026-01-19 ENCOUNTER — APPOINTMENT (OUTPATIENT)
Dept: PRIMARY CARE | Facility: CLINIC | Age: 71
End: 2026-01-19
Payer: MEDICARE